# Patient Record
Sex: MALE | Race: WHITE | NOT HISPANIC OR LATINO | Employment: FULL TIME | URBAN - METROPOLITAN AREA
[De-identification: names, ages, dates, MRNs, and addresses within clinical notes are randomized per-mention and may not be internally consistent; named-entity substitution may affect disease eponyms.]

---

## 2019-09-26 ENCOUNTER — OFFICE VISIT (OUTPATIENT)
Dept: FAMILY MEDICINE | Facility: CLINIC | Age: 64
End: 2019-09-26
Payer: COMMERCIAL

## 2019-09-26 VITALS
TEMPERATURE: 98.7 F | OXYGEN SATURATION: 96 % | WEIGHT: 222 LBS | BODY MASS INDEX: 30.07 KG/M2 | HEART RATE: 89 BPM | DIASTOLIC BLOOD PRESSURE: 80 MMHG | SYSTOLIC BLOOD PRESSURE: 160 MMHG | HEIGHT: 72 IN

## 2019-09-26 DIAGNOSIS — M25.512 LEFT SHOULDER PAIN, UNSPECIFIED CHRONICITY: Primary | ICD-10-CM

## 2019-09-26 DIAGNOSIS — E11.69 DM TYPE 2 WITH DIABETIC MIXED HYPERLIPIDEMIA (CMS/HCC)  (CMS/HCC): ICD-10-CM

## 2019-09-26 DIAGNOSIS — Z23 NEED FOR SHINGLES VACCINE: ICD-10-CM

## 2019-09-26 DIAGNOSIS — Z12.11 COLON CANCER SCREENING: ICD-10-CM

## 2019-09-26 DIAGNOSIS — E78.2 DM TYPE 2 WITH DIABETIC MIXED HYPERLIPIDEMIA (CMS/HCC)  (CMS/HCC): ICD-10-CM

## 2019-09-26 DIAGNOSIS — Z23 NEED FOR TDAP VACCINATION: ICD-10-CM

## 2019-09-26 PROCEDURE — 90471 IMMUNIZATION ADMIN: CPT | Performed by: FAMILY MEDICINE

## 2019-09-26 PROCEDURE — 99214 OFFICE O/P EST MOD 30 MIN: CPT | Mod: 25 | Performed by: FAMILY MEDICINE

## 2019-09-26 PROCEDURE — 90750 HZV VACC RECOMBINANT IM: CPT | Performed by: FAMILY MEDICINE

## 2019-09-26 PROCEDURE — 90715 TDAP VACCINE 7 YRS/> IM: CPT | Performed by: FAMILY MEDICINE

## 2019-09-26 PROCEDURE — 90472 IMMUNIZATION ADMIN EACH ADD: CPT | Performed by: FAMILY MEDICINE

## 2019-09-26 RX ORDER — LISINOPRIL 20 MG/1
1 TABLET ORAL DAILY
COMMUNITY
Start: 2019-09-21 | End: 2024-04-02

## 2019-09-26 RX ORDER — ASPIRIN 81 MG/1
81 TABLET ORAL DAILY
COMMUNITY
Start: 2014-03-26

## 2019-09-26 RX ORDER — GLIMEPIRIDE 4 MG/1
4 TABLET ORAL 2 TIMES DAILY
COMMUNITY
Start: 2017-04-05

## 2019-09-26 RX ORDER — TESTOSTERONE 30 MG/1.5ML
1 SOLUTION TOPICAL DAILY
COMMUNITY
Start: 2017-04-05 | End: 2024-06-19

## 2019-09-26 RX ORDER — PANTOPRAZOLE SODIUM 40 MG/1
1 TABLET, DELAYED RELEASE ORAL DAILY
COMMUNITY
Start: 2019-09-23 | End: 2021-10-12 | Stop reason: SDUPTHER

## 2019-09-26 RX ORDER — ATORVASTATIN CALCIUM 40 MG/1
1 TABLET, FILM COATED ORAL DAILY
COMMUNITY
Start: 2019-09-23

## 2019-09-26 RX ORDER — EMPAGLIFLOZIN 25 MG/1
1 TABLET, FILM COATED ORAL DAILY
COMMUNITY
Start: 2019-09-22 | End: 2024-04-02 | Stop reason: SINTOL

## 2019-09-26 RX ORDER — METFORMIN HYDROCHLORIDE 500 MG/1
1000 TABLET, EXTENDED RELEASE ORAL 2 TIMES DAILY WITH MEALS
COMMUNITY
Start: 2019-09-23

## 2019-09-26 ASSESSMENT — ENCOUNTER SYMPTOMS
UNEXPECTED WEIGHT CHANGE: 0
EYES NEGATIVE: 1
FATIGUE: 0
LIMITED RANGE OF MOTION: 1
MUSCULOSKELETAL NEGATIVE: 1
PSYCHIATRIC NEGATIVE: 1
RESPIRATORY NEGATIVE: 1
NEUROLOGICAL NEGATIVE: 1
NUMBNESS: 0
CARDIOVASCULAR NEGATIVE: 1

## 2019-09-26 NOTE — PROGRESS NOTES
"  Subjective     Patient ID: Kvng Garland is a 64 y.o. male.    Shoulder Pain    The pain is present in the left shoulder. This is a chronic problem. Episode onset: 3 months  The problem occurs intermittently. The problem has been unchanged. The quality of the pain is described as aching. The pain is moderate. Associated symptoms include a limited range of motion. Pertinent negatives include no joint swelling or numbness. The symptoms are aggravated by activity. He has tried OTC pain meds for the symptoms. The treatment provided mild relief.       Review of Systems   Constitutional: Negative for fatigue and unexpected weight change.   HENT: Negative.    Eyes: Negative.    Respiratory: Negative.    Cardiovascular: Negative.    Genitourinary: Negative.    Musculoskeletal: Negative.    Skin: Negative.    Neurological: Negative.  Negative for numbness.   Psychiatric/Behavioral: Negative.        Objective     Vitals:    09/26/19 1056   BP: (!) 160/80   BP Location: Left upper arm   Patient Position: Sitting   Pulse: 89   Temp: 37.1 °C (98.7 °F)   TempSrc: Tympanic   SpO2: 96%   Weight: 101 kg (222 lb)   Height: 1.816 m (5' 11.5\")     Body mass index is 30.53 kg/m².    Physical Exam   Constitutional: He is oriented to person, place, and time. He appears well-developed and well-nourished.   HENT:   Head: Normocephalic.   Eyes: Pupils are equal, round, and reactive to light.   Cardiovascular: Normal rate, regular rhythm and normal heart sounds.    Pulmonary/Chest: Effort normal and breath sounds normal.   Abdominal: Bowel sounds are normal.   Musculoskeletal: He exhibits tenderness. He exhibits no edema or deformity.   Neurological: He is alert and oriented to person, place, and time.   Skin: Skin is warm and dry.   Psychiatric: He has a normal mood and affect.   Vitals reviewed.      Assessment/Plan   Diagnoses and all orders for this visit:    Left shoulder pain, unspecified chronicity (Primary)  -     Ambulatory " referral to Orthopedic Surgery; Future    DM type 2 with diabetic mixed hyperlipidemia (CMS/HCC) (HCC)    Colon cancer screening  -     FIT; Future    Follow up pending consult.     I, Mayr Dial, am scribing for, and in the presence of, Keith Soto DO.    9/26/2019 11:29 AM    IKeith DO, personally performed the services described in this documentation as scribed by Mary Dial in my presence, and it is both accurate and complete.

## 2019-12-06 ENCOUNTER — CLINICAL SUPPORT (OUTPATIENT)
Dept: FAMILY MEDICINE | Facility: CLINIC | Age: 64
End: 2019-12-06
Payer: COMMERCIAL

## 2019-12-06 DIAGNOSIS — Z23 NEED FOR SHINGLES VACCINE: Primary | ICD-10-CM

## 2019-12-06 PROCEDURE — 90471 IMMUNIZATION ADMIN: CPT | Performed by: INTERNAL MEDICINE

## 2019-12-06 PROCEDURE — 90750 HZV VACC RECOMBINANT IM: CPT | Performed by: INTERNAL MEDICINE

## 2020-08-20 ENCOUNTER — PATIENT OUTREACH (OUTPATIENT)
Dept: FAMILY MEDICINE | Facility: CLINIC | Age: 65
End: 2020-08-20

## 2021-02-06 ENCOUNTER — APPOINTMENT (EMERGENCY)
Dept: RADIOLOGY | Facility: HOSPITAL | Age: 66
End: 2021-02-06
Attending: EMERGENCY MEDICINE
Payer: COMMERCIAL

## 2021-02-06 ENCOUNTER — HOSPITAL ENCOUNTER (EMERGENCY)
Facility: HOSPITAL | Age: 66
Discharge: HOME | End: 2021-02-06
Attending: EMERGENCY MEDICINE
Payer: COMMERCIAL

## 2021-02-06 VITALS
DIASTOLIC BLOOD PRESSURE: 84 MMHG | RESPIRATION RATE: 16 BRPM | TEMPERATURE: 99.7 F | HEART RATE: 90 BPM | HEIGHT: 72 IN | OXYGEN SATURATION: 99 % | SYSTOLIC BLOOD PRESSURE: 164 MMHG | BODY MASS INDEX: 31.15 KG/M2 | WEIGHT: 230 LBS

## 2021-02-06 DIAGNOSIS — R42 VERTIGO: Primary | ICD-10-CM

## 2021-02-06 LAB
ALBUMIN SERPL-MCNC: 4.7 G/DL (ref 3.4–5)
ALP SERPL-CCNC: 65 IU/L (ref 35–126)
ALT SERPL-CCNC: 42 IU/L (ref 16–63)
ANION GAP SERPL CALC-SCNC: 10 MEQ/L (ref 3–15)
AST SERPL-CCNC: 24 IU/L (ref 15–41)
BASOPHILS # BLD: 0.06 K/UL (ref 0.01–0.1)
BASOPHILS NFR BLD: 0.7 %
BILIRUB SERPL-MCNC: 3.5 MG/DL (ref 0.3–1.2)
BUN SERPL-MCNC: 18 MG/DL (ref 8–20)
BURR CELLS BLD QL SMEAR: ABNORMAL
CALCIUM SERPL-MCNC: 9.1 MG/DL (ref 8.9–10.3)
CHLORIDE SERPL-SCNC: 105 MEQ/L (ref 98–109)
CO2 SERPL-SCNC: 20 MEQ/L (ref 22–32)
CREAT SERPL-MCNC: 1 MG/DL (ref 0.8–1.3)
DACRYOCYTES BLD QL SMEAR: ABNORMAL
DIFFERENTIAL METHOD BLD: ABNORMAL
EOSINOPHIL # BLD: 0 K/UL (ref 0.04–0.54)
EOSINOPHIL NFR BLD: 0 %
ERYTHROCYTE [DISTWIDTH] IN BLOOD BY AUTOMATED COUNT: 14.4 % (ref 11.6–14.4)
GFR SERPL CREATININE-BSD FRML MDRD: >60 ML/MIN/1.73M*2
GLUCOSE SERPL-MCNC: 155 MG/DL (ref 70–99)
HCT VFR BLDCO AUTO: 44.9 % (ref 40.1–51)
HGB BLD-MCNC: 15.5 G/DL (ref 13.7–17.5)
IMM GRANULOCYTES # BLD AUTO: 0.05 K/UL (ref 0–0.08)
IMM GRANULOCYTES NFR BLD AUTO: 0.6 %
LYMPHOCYTES # BLD: 0.44 K/UL (ref 1.2–3.5)
LYMPHOCYTES NFR BLD: 4.9 %
MCH RBC QN AUTO: 30.9 PG (ref 28–33.2)
MCHC RBC AUTO-ENTMCNC: 34.5 G/DL (ref 32.2–36.5)
MCV RBC AUTO: 89.6 FL (ref 83–98)
MONOCYTES # BLD: 0.46 K/UL (ref 0.3–1)
MONOCYTES NFR BLD: 5.1 %
NEUTROPHILS # BLD: 8.04 K/UL (ref 1.7–7)
NEUTS SEG NFR BLD: 88.7 %
NRBC BLD-RTO: 0 %
PDW BLD AUTO: 10.5 FL (ref 9.4–12.4)
PLAT MORPH BLD: NORMAL
PLATELET # BLD AUTO: 139 K/UL (ref 150–350)
PLATELET # BLD EST: ABNORMAL 10*3/UL
POIKILOCYTOSIS BLD QL SMEAR: ABNORMAL
POLYCHROMASIA BLD QL SMEAR: ABNORMAL
POTASSIUM SERPL-SCNC: 4 MEQ/L (ref 3.6–5.1)
PROT SERPL-MCNC: 7.6 G/DL (ref 6–8.2)
RBC # BLD AUTO: 5.01 M/UL (ref 4.5–5.8)
SODIUM SERPL-SCNC: 135 MEQ/L (ref 136–144)
SPHEROCYTES BLD QL SMEAR: ABNORMAL
TROPONIN I SERPL-MCNC: 0.02 NG/ML
WBC # BLD AUTO: 9.05 K/UL (ref 3.8–10.5)

## 2021-02-06 PROCEDURE — 70450 CT HEAD/BRAIN W/O DYE: CPT | Mod: MG

## 2021-02-06 PROCEDURE — 84484 ASSAY OF TROPONIN QUANT: CPT | Performed by: PHYSICIAN ASSISTANT

## 2021-02-06 PROCEDURE — 93005 ELECTROCARDIOGRAM TRACING: CPT

## 2021-02-06 PROCEDURE — 63700000 HC SELF-ADMINISTRABLE DRUG: Performed by: PHYSICIAN ASSISTANT

## 2021-02-06 PROCEDURE — 85025 COMPLETE CBC W/AUTO DIFF WBC: CPT | Performed by: PHYSICIAN ASSISTANT

## 2021-02-06 PROCEDURE — 36415 COLL VENOUS BLD VENIPUNCTURE: CPT | Performed by: PHYSICIAN ASSISTANT

## 2021-02-06 PROCEDURE — 99284 EMERGENCY DEPT VISIT MOD MDM: CPT | Mod: 25

## 2021-02-06 PROCEDURE — 80053 COMPREHEN METABOLIC PANEL: CPT | Performed by: PHYSICIAN ASSISTANT

## 2021-02-06 RX ORDER — MECLIZINE HYDROCHLORIDE 25 MG/1
25 TABLET ORAL 3 TIMES DAILY PRN
Qty: 30 TABLET | Refills: 0 | Status: SHIPPED | OUTPATIENT
Start: 2021-02-06 | End: 2021-10-12 | Stop reason: ALTCHOICE

## 2021-02-06 RX ORDER — MECLIZINE HYDROCHLORIDE 25 MG/1
25 TABLET ORAL ONCE
Status: COMPLETED | OUTPATIENT
Start: 2021-02-06 | End: 2021-02-06

## 2021-02-06 RX ADMIN — MECLIZINE HYDROCHLORIDE 25 MG: 25 TABLET ORAL at 16:33

## 2021-02-06 ASSESSMENT — ENCOUNTER SYMPTOMS
AGITATION: 0
EYE PAIN: 0
ARTHRALGIAS: 0
MYALGIAS: 1
CHILLS: 0
ABDOMINAL PAIN: 0
FATIGUE: 1
SEIZURES: 0
DYSURIA: 0
SORE THROAT: 0
SHORTNESS OF BREATH: 0
COUGH: 0
HEMATURIA: 0
CONFUSION: 0
FEVER: 0
DIZZINESS: 1
COLOR CHANGE: 0
PALPITATIONS: 0
VOMITING: 0
BACK PAIN: 0

## 2021-02-06 NOTE — ED ATTESTATION NOTE
I have personally seen and examined the patient.  I reviewed and agree with physician assistant / nurse practitioner’s assessment and plan of care, with the following exceptions: None  My examination, assessment, and plan of care of  Kvng Garland is as follows:       Vital Signs Review: Vital signs have been reviewed. The oxygen saturation is  SpO2: 96 % which is normal.    The patient is a 65-year-old male with past medical history of CVA, diabetes, hearing loss, who presents to the emergency department for evaluation of vertigo.  Patient received COVID-19 vaccination yesterday and today began having diffuse myalgias, nausea, and transient vertigo.  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  Patient's vertigo was worse with change in head position or standing.  Patient symptoms would resolve with laying still.  The patient states his symptoms have gradually improved.  He denies headache, neck pain, vision change, speech change, swallowing difficulty, new focal neurologic weakness, or numbness.  The patient has had reported CVA in the past with similar symptoms however patient's vertigo description was different at that time.    Physical exam: Vital signs reviewed.  General: Patient appears comfortable sitting up in chair.  HEENT: NCAT, EOMI, MMM, irregular pupils consistent with patient's known history of right penetrating trauma to the globe in the past.  Neck: Supple, nontender, full range of motion.  Chest: Lungs clear to auscultation bilaterally, no rales.  Heart: Regular rate and rhythm no murmurs.  Abdomen: Soft, nontender, nondistended, no guarding, rebound.  Extremities: No cyanosis, clubbing, edema, or calf tenderness.  Skin: Warm and dry, no rash.  Neuro: GCS 15.  5/5 strength bilateral upper and lower extremities, sensation normal, cranial nerves II to XII intact, finger-to-nose normal.    Plan: Check labs, CT scan brain rule  out acute CVA.    Labs Reviewed   CBC AND DIFF - Abnormal       Result Value    WBC 9.05      RBC 5.01      Hemoglobin 15.5      Hematocrit 44.9      MCV 89.6      MCH 30.9      MCHC 34.5      RDW 14.4      Platelets 139 (*)     MPV 10.5      Differential Type Auto      nRBC 0.0      Immature Granulocytes 0.6      Neutrophils 88.7      Lymphocytes 4.9      Monocytes 5.1      Eosinophils 0.0      Basophils 0.7      Immature Granulocytes, Absolute 0.05      Neutrophils, Absolute 8.04 (*)     Lymphocytes, Absolute 0.44 (*)     Monocytes, Absolute 0.46      Eosinophils, Absolute 0.00 (*)     Basophils, Absolute 0.06      PLT Morphology Normal      Platelet Estimate Decreased (51,000-149,000)      Poikilocytes 1+      Polychromasia Occasional      Spherocytes Occasional      Teardrop Cells Occasional      [place] Cells Occasional     COMPREHENSIVE METABOLIC PANEL - Abnormal    Sodium 135 (*)     Potassium 4.0      Chloride 105      CO2 20 (*)     BUN 18      Creatinine 1.0      Glucose 155 (*)     Calcium 9.1      AST (SGOT) 24      ALT (SGPT) 42      Alkaline Phosphatase 65      Total Protein 7.6      Albumin 4.7      Bilirubin, Total 3.5 (*)     eGFR >60.0      Anion Gap 10     TROPONIN I - Normal    Troponin I 0.02       CT HEAD WITHOUT IV CONTRAST   Final Result   IMPRESSION: 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  No acute intracranial hemorrhage or   acute cortical infarction demonstrated.      COMMENT: Computed tomography of the brain was performed at 3 mm intervals   without intravenous contrast administration.  The current examination was   compared to prior CT imaging dated 3/5/2014.      There is no evidence of mass effect or shift of the midline structures.  The   ventricular system is normal in size and configuration.  There is no evidence of   acute intracranial hemorrhage or acute cortical infarction.  There are no   extra-axial fluid collections.      There is evidence of an old left cerebellar infarction with  multiple small   lacunar and mild encephalomalacic changes of the inferior aspect of the left   lobe of the cerebellum.      The bony calvarium is intact.  Imaged portions of the paranasal sinuses and   mastoid air cells are clear and intact.      CT DOSE:  One or more dose reduction techniques (e.g. automated exposure   control, adjustment of the mA and/or kV according to patient size, use of   iterative reconstruction technique) utilized for this examination.         ECG 12 lead    (Results Pending)           Patient test results are reviewed.  The patient's symptoms have improved in the emergency department.  Patient states he feels better.  Patient is agreeable to follow-up closely with his PCP for reevaluation.  Patient is to return immediately for any new, concerning, or worsening symptoms.    Impression: Acute positional vertigo.  Possible COVID-19 vaccine side effects.         I was physically present for the key/critical portions of the following procedures: None     Miguel A Enriquez MD  02/06/21 8238

## 2021-02-06 NOTE — ED PROVIDER NOTES
HPI     Chief Complaint   Patient presents with   • Muscle Pain       The patient is a 65-year-old male with past medical history of type 2 diabetes, hearing loss, and prior stroke 5 years ago who presents today for evaluation of acute onset of dizziness prior to arrival.  Patient tells me he was working around noontime today when he developed sudden room spinning dizziness.  Patient states he had to lay down on a bed to recover.  Patient states when he remains still he felt much better.  He was able to call his wife to come pick him up from his job and bring him here.  He reports that he received his first dose of Moderna COVID-19 vaccine yesterday.  He has been having arm soreness and generalized myalgias today as well.  Patient reports his dizziness is similar to a prior CVA that he suffered 5 years ago.  He denies any associated focal neurological deficits today.  Patient denies focal weakness, headache, visual change, or speech changes. He reports that symptoms have overall improved but he still feels off balance.      History provided by:  Patient   used: No         Patient History     Past Medical History:   Diagnosis Date   • Hearing loss     deaf in right ear, left hearing loss   • Stroke (CMS/[facility]) 02/23/2014   • Type 2 diabetes mellitus (CMS/[facility])        Past Surgical History:   Procedure Laterality Date   • NO PAST SURGERIES         History reviewed. No pertinent family history.    Social History     Tobacco Use   • Smoking status: Never Smoker   • Smokeless tobacco: Never Used   Substance Use Topics   • Alcohol use: Yes     Comment: socially   • Drug use: No       Systems Reviewed from Nursing Triage:          Review of Systems     Review of Systems   Constitutional: Positive for fatigue. Negative for chills and fever.   HENT: Negative for ear pain and sore throat.    Eyes: Negative for pain and visual disturbance.   Respiratory: Negative for cough and shortness of breath.     Cardiovascular: Negative for chest pain and palpitations.   Gastrointestinal: Negative for abdominal pain and vomiting.   Endocrine: Negative for polyuria.   Genitourinary: Negative for dysuria and hematuria.   Musculoskeletal: Positive for myalgias. Negative for arthralgias and back pain.   Skin: Negative for color change and rash.   Neurological: Positive for dizziness. Negative for seizures and syncope.   Psychiatric/Behavioral: Negative for agitation and confusion.        Physical Exam     ED Vitals    Date/Time Temp Pulse Resp BP SpO2 [facility]   02/06/21 1717 -- 90 16 164/84 99 % MG   02/06/21 1634 37.6 °C (99.7 °F) 96 17 158/79 98 % HC   02/06/21 1458 37 °C (98.6 °F) 100 17 143/71 96 % SM   02/06/21 1317 37.4 °C (99.3 °F) 95 18 150/72 96 % DR          Pulse Ox %: 96 % (02/06/21 1430)  Pulse Ox Interpretation: Normal (02/06/21 1430)  Heart Rate: 95 (02/06/21 1430)  Rhythm Strip Interpretation: Normal Sinus Rhythm (02/06/21 1430)                                       Physical Exam  Vitals signs and nursing note reviewed.   Constitutional:       General: He is not in acute distress.     Appearance: Normal appearance. He is well-developed. He is not diaphoretic.   HENT:      Head: Normocephalic and atraumatic.   Eyes:      Extraocular Movements: Extraocular movements intact.      Right eye: Nystagmus (on right lateral gaze) present.      Left eye: Nystagmus present.      Conjunctiva/sclera: Conjunctivae normal.      Pupils: Pupils are equal, round, and reactive to light.   Neck:      Trachea: Trachea normal.   Cardiovascular:      Rate and Rhythm: Normal rate and regular rhythm.      Heart sounds: Normal heart sounds. No murmur.   Pulmonary:      Effort: Pulmonary effort is normal. No respiratory distress.      Breath sounds: Normal breath sounds. No wheezing, rhonchi or rales.   Chest:      Chest wall: No tenderness.   Abdominal:      General: Bowel sounds are normal. There is no distension.      Palpations: Abdomen  is soft. There is no mass.      Tenderness: There is no abdominal tenderness. There is no guarding or rebound.   Skin:     General: Skin is warm and dry.      Coloration: Skin is not pale.      Findings: No rash.   Neurological:      Mental Status: He is alert and oriented to person, place, and time.      Cranial Nerves: No cranial nerve deficit.      Sensory: No sensory deficit.      Motor: No weakness.      Coordination: Coordination normal.      Gait: Gait normal.   Psychiatric:         Speech: Speech normal.         Behavior: Behavior normal. Behavior is cooperative.         Thought Content: Thought content normal.         Judgment: Judgment normal.              Procedures    Results     Procedure Component Value Units Date/Time    CBC and differential [[id number]]  (Abnormal) Collected: 02/06/21 1456    Specimen: Blood, Venous Updated: 02/06/21 1544     WBC 9.05 K/uL      RBC 5.01 M/uL      Hemoglobin 15.5 g/dL      Hematocrit 44.9 %      MCV 89.6 fL      MCH 30.9 pg      MCHC 34.5 g/dL      RDW 14.4 %      Platelets 139 K/uL      Comment: ALL RESULTS HAVE BEEN RECHECKED. RESULTS CHECKED. CONFIRMED WITH SMEAR ESTIMATE. SPECIMEN QUALITY CHECKED. RESULTS OBTAINED AFTER VORTEXING TO ELIMINATE PLT CLUMPS        MPV 10.5 fL      Differential Type Auto     nRBC 0.0 %      Immature Granulocytes 0.6 %      Neutrophils 88.7 %      Lymphocytes 4.9 %      Monocytes 5.1 %      Eosinophils 0.0 %      Basophils 0.7 %      Immature Granulocytes, Absolute 0.05 K/uL      Neutrophils, Absolute 8.04 K/uL      Lymphocytes, Absolute 0.44 K/uL      Monocytes, Absolute 0.46 K/uL      Eosinophils, Absolute 0.00 K/uL      Basophils, Absolute 0.06 K/uL      PLT Morphology Normal     Platelet Estimate Decreased (51,000-149,000)     Poikilocytes 1+     Polychromasia Occasional     Spherocytes Occasional     Teardrop Cells Occasional     [place] Cells Occasional    Troponin I [[id number]]  (Normal) Collected: 02/06/21 1456    Specimen: Blood,  Venous Updated: 02/06/21 1531     Troponin I 0.02 ng/mL     Comprehensive metabolic panel [[id number]]  (Abnormal) Collected: 02/06/21 1456    Specimen: Blood, Venous Updated: 02/06/21 1526     Sodium 135 mEQ/L      Potassium 4.0 mEQ/L      Comment: Results obtained on plasma. Plasma Potassium values may be up to 0.4 mEQ/L less than serum values. The differences may be greater for patients with high platelet or white cell counts.        Chloride 105 mEQ/L      CO2 20 mEQ/L      BUN 18 mg/dL      Creatinine 1.0 mg/dL      Glucose 155 mg/dL      Calcium 9.1 mg/dL      AST (SGOT) 24 IU/L      ALT (SGPT) 42 IU/L      Alkaline Phosphatase 65 IU/L      Total Protein 7.6 g/dL      Comment: Test performed on plasma which typically contains approximately 0.4 g/dL more protein than serum.        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  No acute intracranial hemorrhage or  acute cortical infarction demonstrated.    COMMENT: Computed tomography of the brain was performed at 3 mm intervals  without intravenous contrast administration.  The current examination was  compared to prior CT imaging dated 3/5/2014.    There is no evidence of mass effect or shift of the midline structures.  The  ventricular system is normal in size and configuration.  There is no evidence of  acute intracranial hemorrhage or acute cortical infarction.  There are no  extra-axial fluid collections.    There is evidence of an old left cerebellar infarction with multiple small  lacunar and mild encephalomalacic changes of the inferior aspect of the left  lobe of the cerebellum.    The bony calvarium is intact.  Imaged portions of the paranasal sinuses and  mastoid air cells are clear and  intact.    CT DOSE:  One or more dose reduction techniques (e.g. automated exposure  control, adjustment of the mA and/or kV according to patient size, use of  iterative reconstruction technique) utilized for this examination.               Narrative:    CLINICAL HISTORY: Persistent recurring dizziness.                                ECG 12 lead   ED Interpretation   Rhythm: [NSR]  Rate: 95  P waves: [normal interval]  QRS: [normal QRS]  Axis: [LAD]  ST Segments: [no obvious ST elevation or ischemia]                          ED Course & MDM     MDM         ED Course as of Feb 06 1836   Sat Feb 06, 2021   1700 To patient's bedside. Patient exam unchanged. Patient resting comfortably.  Discussed results and plan with patient.  Patient verbalized understanding and agreeable without any questions or concerns.      Pt feeling better. Still some myalgias and fatigue but dizziness resolved. NO focal findings. CT does not show any acute findings. Discussed peripheral vertigo vs TIA. Pt prefers to go home. Will follow up with PCP.     Education provided on signs and symptoms that would warrant a return visit to the emergency department along with emphasis on importance of follow-up.  Patient verbalized understanding and agreeable.  All questions answered      [SL]      ED Course User Index  [SL] Liliana Castillo PA C         Clinical Impressions as of Feb 06 1836   Vertigo        Liliana Castillo PA C  02/06/21 1836

## 2021-02-06 NOTE — DISCHARGE INSTRUCTIONS
You may use the Meclizine as prescribed if needed for dizziness.     You should follow up with your PCP on Monday or Tuesday.     Continue to rest at home. Drink plenty of fluids. If your dizziness returns or worsens come back to any emergency department. If you develop any neurological symptoms like weakness on one side of your body,

## 2021-02-07 LAB
ATRIAL RATE: 95
P AXIS: 58
PR INTERVAL: 172
QRS DURATION: 90
QT INTERVAL: 338
QTC CALCULATION(BAZETT): 424
R AXIS: -33
T WAVE AXIS: 57
VENTRICULAR RATE: 95

## 2021-02-07 PROCEDURE — 93010 ELECTROCARDIOGRAM REPORT: CPT | Performed by: INTERNAL MEDICINE

## 2021-02-08 ENCOUNTER — TELEPHONE (OUTPATIENT)
Dept: FAMILY MEDICINE | Facility: CLINIC | Age: 66
End: 2021-02-08

## 2021-02-08 NOTE — TELEPHONE ENCOUNTER
[facility] - ED Follow Up From 02/06/2021    Patient was seen in [facility] ER on 2/6/21 for muscle pain and dizziness.  Patient states he had COVID Vaccinne on Friday 2/5/21,and due for 2nd one in a month and wants to know if he should get it. He feels alright today but not great. Please call [phone].

## 2021-02-09 NOTE — TELEPHONE ENCOUNTER
Left voicemail for patient.  It sounds like his symptoms are related to the Covid vaccine.  Advised that he should get the second 1.  We will try to call him tomorrow advised to take Tylenol for aches and fever.  And to stay hydrated

## 2021-02-11 NOTE — TELEPHONE ENCOUNTER
Spoke with patient.  He is much improved.  Really can attribute his symptoms to his Covid vaccine.  He is feeling much better.  And will be armed for the next 1 to be hydrated ahead of time.

## 2021-04-13 DIAGNOSIS — Z23 ENCOUNTER FOR IMMUNIZATION: ICD-10-CM

## 2021-10-12 ENCOUNTER — OFFICE VISIT (OUTPATIENT)
Dept: FAMILY MEDICINE | Facility: CLINIC | Age: 66
End: 2021-10-12
Payer: COMMERCIAL

## 2021-10-12 VITALS
HEART RATE: 86 BPM | BODY MASS INDEX: 30.2 KG/M2 | WEIGHT: 223 LBS | DIASTOLIC BLOOD PRESSURE: 70 MMHG | RESPIRATION RATE: 16 BRPM | TEMPERATURE: 97.1 F | SYSTOLIC BLOOD PRESSURE: 150 MMHG | HEIGHT: 72 IN | OXYGEN SATURATION: 98 %

## 2021-10-12 DIAGNOSIS — E11.9 TYPE 2 DIABETES MELLITUS WITHOUT COMPLICATION, WITHOUT LONG-TERM CURRENT USE OF INSULIN (CMS/HCC): ICD-10-CM

## 2021-10-12 DIAGNOSIS — E78.2 DM TYPE 2 WITH DIABETIC MIXED HYPERLIPIDEMIA (CMS/HCC)  (CMS/HCC): ICD-10-CM

## 2021-10-12 DIAGNOSIS — Z00.01 ANNUAL VISIT FOR GENERAL ADULT MEDICAL EXAMINATION WITH ABNORMAL FINDINGS: Primary | ICD-10-CM

## 2021-10-12 DIAGNOSIS — Z01.818 PREOP EXAMINATION: ICD-10-CM

## 2021-10-12 DIAGNOSIS — E11.69 DM TYPE 2 WITH DIABETIC MIXED HYPERLIPIDEMIA (CMS/HCC)  (CMS/HCC): ICD-10-CM

## 2021-10-12 PROCEDURE — 3008F BODY MASS INDEX DOCD: CPT | Performed by: INTERNAL MEDICINE

## 2021-10-12 PROCEDURE — 99396 PREV VISIT EST AGE 40-64: CPT | Performed by: INTERNAL MEDICINE

## 2021-10-12 RX ORDER — PANTOPRAZOLE SODIUM 40 MG/1
40 TABLET, DELAYED RELEASE ORAL DAILY
Qty: 90 TABLET | Refills: 1 | Status: SHIPPED | OUTPATIENT
Start: 2021-10-12 | End: 2022-03-30

## 2021-10-12 ASSESSMENT — PATIENT HEALTH QUESTIONNAIRE - PHQ9: SUM OF ALL RESPONSES TO PHQ9 QUESTIONS 1 & 2: 0

## 2021-10-13 PROBLEM — E78.2 DM TYPE 2 WITH DIABETIC MIXED HYPERLIPIDEMIA (CMS/HCC)  (CMS/HCC): Status: ACTIVE | Noted: 2021-10-13

## 2021-10-13 PROBLEM — E11.9 TYPE 2 DIABETES MELLITUS WITHOUT COMPLICATION, WITHOUT LONG-TERM CURRENT USE OF INSULIN (CMS/HCC): Status: ACTIVE | Noted: 2021-10-13

## 2021-10-13 PROBLEM — Z00.01 ANNUAL VISIT FOR GENERAL ADULT MEDICAL EXAMINATION WITH ABNORMAL FINDINGS: Status: ACTIVE | Noted: 2021-10-13

## 2021-10-13 PROBLEM — E11.69 DM TYPE 2 WITH DIABETIC MIXED HYPERLIPIDEMIA (CMS/HCC)  (CMS/HCC): Status: ACTIVE | Noted: 2021-10-13

## 2021-10-13 ASSESSMENT — ENCOUNTER SYMPTOMS
ENDOCRINE NEGATIVE: 1
HEMATOLOGIC/LYMPHATIC NEGATIVE: 1
NEUROLOGICAL NEGATIVE: 1
PSYCHIATRIC NEGATIVE: 1
MUSCULOSKELETAL NEGATIVE: 1
CONSTITUTIONAL NEGATIVE: 1
ALLERGIC/IMMUNOLOGIC NEGATIVE: 1
CARDIOVASCULAR NEGATIVE: 1
RESPIRATORY NEGATIVE: 1
GASTROINTESTINAL NEGATIVE: 1
EYES NEGATIVE: 1

## 2021-10-13 NOTE — ASSESSMENT & PLAN NOTE
Patient follows with endocrinology.  We will asked endocrine to send us his labs.  He reports he gets them done once every 3 months.  He needs a new referral for Dr. Mosqueda

## 2021-10-13 NOTE — ASSESSMENT & PLAN NOTE
Patient is a 66-year-old man with diabetes, hypertension, hypercholesterolemia and low testosterone who presents for his annual physical exam.  Overall the patient is doing well.  He does follow with diabetes and he follows with ophthalmology.  We will track down the reports from both of those  physicians.  Patient had a colonoscopy in 2013.  Findings did indicate a 10-year follow-up.  We will asked patient to sign release of records for ophthalmology and endocrine.  Colonoscopy is already documented but just needs to be put into care gap

## 2021-10-13 NOTE — PROGRESS NOTES
Subjective     Patient ID: Kvng Garland is a 66 y.o. male.    HPI patient here for annual physical  Review of Systems   Constitutional: Negative.    HENT: Negative.    Eyes: Negative.    Respiratory: Negative.    Cardiovascular: Negative.    Gastrointestinal: Negative.    Endocrine: Negative.    Genitourinary: Negative.    Musculoskeletal: Negative.    Skin: Negative.    Allergic/Immunologic: Negative.    Neurological: Negative.    Hematological: Negative.    Psychiatric/Behavioral: Negative.        Objective     Vitals:    10/12/21 1104   BP: (!) 150/70   BP Location: Left upper arm   Patient Position: Sitting   Pulse: 86   Resp: 16   Temp: 36.2 °C (97.1 °F)   TempSrc: Temporal   SpO2: 98%   Weight: 101 kg (223 lb)   Height: 1.829 m (6')     Body mass index is 30.24 kg/m².    Physical Exam  Vitals and nursing note reviewed.   Constitutional:       Appearance: He is well-developed.   HENT:      Head: Normocephalic and atraumatic.      Right Ear: External ear normal.      Left Ear: External ear normal.      Nose: Nose normal.   Eyes:      Conjunctiva/sclera: Conjunctivae normal.      Pupils: Pupils are equal, round, and reactive to light.   Cardiovascular:      Rate and Rhythm: Normal rate and regular rhythm.      Pulses: Normal pulses.      Heart sounds: Normal heart sounds.   Pulmonary:      Effort: Pulmonary effort is normal.      Breath sounds: Normal breath sounds.   Abdominal:      General: Bowel sounds are normal.      Palpations: Abdomen is soft.   Musculoskeletal:         General: Normal range of motion.      Cervical back: Normal range of motion and neck supple.   Skin:     General: Skin is warm and dry.      Capillary Refill: Capillary refill takes less than 2 seconds.   Neurological:      General: No focal deficit present.      Mental Status: He is alert and oriented to person, place, and time.   Psychiatric:         Mood and Affect: Mood normal.         Behavior: Behavior normal.         Thought  Content: Thought content normal.         Judgment: Judgment normal.         Assessment/Plan   Diagnoses and all orders for this visit:    Annual visit for general adult medical examination with abnormal findings (Primary)  Assessment & Plan:  Patient is a 66-year-old man with diabetes, hypertension, hypercholesterolemia and low testosterone who presents for his annual physical exam.  Overall the patient is doing well.  He does follow with diabetes and he follows with ophthalmology.  We will track down the reports from both of those  physicians.  Patient had a colonoscopy in 2013.  Findings did indicate a 10-year follow-up.  We will asked patient to sign release of records for ophthalmology and endocrine.  Colonoscopy is already documented but just needs to be put into care gap      Type 2 diabetes mellitus without complication, without long-term current use of insulin (CMS/[facility])  Assessment & Plan:  Patient follows with endocrinology.  We will asked endocrine to send us his labs.  He reports he gets them done once every 3 months.  He needs a new referral for Dr. Mosqueda    Orders:  -     Ambulatory referral to Endocrinology; Future    DM type 2 with diabetic mixed hyperlipidemia (CMS/[facility])  Assessment & Plan:  Patient is here for review of diabetes.  Patient does follow with endocrinology.  Does need a referral he is presently on Metformin 500 mg once at night.  Glimepiride 4 mg once daily and Jardiance 25 mg once daily.  Patient is on atorvastatin 40 mg daily for high cholesterol.      Preop examination  Assessment & Plan:  12/20/21: Subsequent to this visit patient requires preoperative clearance for cataract surgery.  Patient has a past medical history of diabetes, hypercholesterolemia.  Patient denies chest pain, shortness of breath, palpitations..  Preoperative clearance was delayed due to need for diabetic labs.  Hemoglobin A1c is 8.9.  I did confirm with ophthalmologist office that this should not pose a problem  with cataract surgery.  Patient achieves 4 METS in his activities of daily living.  He has no cardiopulmonary contraindications to surgery.  May proceed with the planned cataract surgery.      Other orders  -     pantoprazole 40 mg EC tablet; Take 1 tablet (40 mg total) by mouth daily.

## 2021-10-13 NOTE — ASSESSMENT & PLAN NOTE
Patient is here for review of diabetes.  Patient does follow with endocrinology.  Does need a referral he is presently on Metformin 500 mg once at night.  Glimepiride 4 mg once daily and Jardiance 25 mg once daily.  Patient is on atorvastatin 40 mg daily for high cholesterol.

## 2021-11-22 ENCOUNTER — TELEPHONE (OUTPATIENT)
Dept: FAMILY MEDICINE | Facility: CLINIC | Age: 66
End: 2021-11-22

## 2021-11-22 NOTE — TELEPHONE ENCOUNTER
Provider called about patient Kvng Garland requesting a letter of clearance for pt to have cataract surgery . Provider stated that patient needs physical to be seen . Patient was seen 10/12/2021 for a physical . The information is as following    Provider name : [facility]  Providers #: [phone] ex . 4   Provider Fax: [phone]  Contact name : Daniel

## 2021-11-24 DIAGNOSIS — E11.9 TYPE 2 DIABETES MELLITUS WITHOUT COMPLICATION, WITHOUT LONG-TERM CURRENT USE OF INSULIN (CMS/HCC): Primary | ICD-10-CM

## 2021-11-24 DIAGNOSIS — E78.2 DM TYPE 2 WITH DIABETIC MIXED HYPERLIPIDEMIA (CMS/HCC)  (CMS/HCC): ICD-10-CM

## 2021-11-24 DIAGNOSIS — E11.69 DM TYPE 2 WITH DIABETIC MIXED HYPERLIPIDEMIA (CMS/HCC)  (CMS/HCC): ICD-10-CM

## 2021-11-26 NOTE — TELEPHONE ENCOUNTER
Called patient. Even though I did see him 10/12. He has not had diabetic labs since 2020. Per  Dr. Mosqueda ( endocrinology)     Left patient voice mail about getting labs done before preoperative clearance can be given.   Labs are pending.. I believed slips were mailed. He was also told he come in and   lab slips at office .

## 2021-12-01 ENCOUNTER — TELEPHONE (OUTPATIENT)
Dept: FAMILY MEDICINE | Facility: CLINIC | Age: 66
End: 2021-12-01

## 2021-12-01 NOTE — TELEPHONE ENCOUNTER
Dr. England's office requesting office note with addendum stating pt is cleared for sx on 1/20. Per note in chart from Dr. Whelan pt to have labs completed before getting cleared. Spoke with pt to confirm he received scripts, he stated he will be going next Tuesday to have labs drawn

## 2021-12-09 ENCOUNTER — TELEPHONE (OUTPATIENT)
Dept: FAMILY MEDICINE | Facility: CLINIC | Age: 66
End: 2021-12-09
Payer: COMMERCIAL

## 2021-12-09 NOTE — TELEPHONE ENCOUNTER
[facility] Outreach: pt due for diabetic labs, ordered 11/24, also for pre op on 1/20 with Dr. England. Spoke with pt last week and he stated he will be going to the lab this past Tuesday, still not collected. Left VM for pt advising to get labs done asap.

## 2021-12-11 LAB
ALBUMIN SERPL-MCNC: 4.9 G/DL (ref 3.8–4.8)
ALBUMIN/CREAT UR: 17 MG/G CREAT (ref 0–29)
ALBUMIN/GLOB SERPL: 2.3 {RATIO} (ref 1.2–2.2)
ALP SERPL-CCNC: 70 IU/L (ref 44–121)
ALT SERPL-CCNC: 41 IU/L (ref 0–44)
AST SERPL-CCNC: 20 IU/L (ref 0–40)
BASOPHILS # BLD AUTO: 0.1 X10E3/UL (ref 0–0.2)
BASOPHILS NFR BLD AUTO: 1 %
BILIRUB SERPL-MCNC: 1.4 MG/DL (ref 0–1.2)
BUN SERPL-MCNC: 26 MG/DL (ref 8–27)
BUN/CREAT SERPL: 23 (ref 10–24)
CALCIUM SERPL-MCNC: 9.7 MG/DL (ref 8.6–10.2)
CHLORIDE SERPL-SCNC: 102 MMOL/L (ref 96–106)
CHOLEST SERPL-MCNC: 127 MG/DL (ref 100–199)
CO2 SERPL-SCNC: 18 MMOL/L (ref 20–29)
CREAT SERPL-MCNC: 1.12 MG/DL (ref 0.76–1.27)
CREAT UR-MCNC: 46.8 MG/DL
EOSINOPHIL # BLD AUTO: 0.1 X10E3/UL (ref 0–0.4)
EOSINOPHIL NFR BLD AUTO: 2 %
ERYTHROCYTE [DISTWIDTH] IN BLOOD BY AUTOMATED COUNT: 13.6 % (ref 11.6–15.4)
GLOBULIN SER CALC-MCNC: 2.1 G/DL (ref 1.5–4.5)
GLUCOSE SERPL-MCNC: 282 MG/DL (ref 65–99)
HBA1C MFR BLD: 8.9 % (ref 4.8–5.6)
HCT VFR BLD AUTO: 46.8 % (ref 37.5–51)
HDLC SERPL-MCNC: 26 MG/DL
HGB BLD-MCNC: 15.7 G/DL (ref 13–17.7)
IMM GRANULOCYTES # BLD AUTO: 0 X10E3/UL (ref 0–0.1)
IMM GRANULOCYTES NFR BLD AUTO: 0 %
LAB CORP EGFR IF AFRICN AM: 80 ML/MIN/1.73
LAB CORP EGFR IF NONAFRICN AM: 69 ML/MIN/1.73
LDLC SERPL CALC-MCNC: 46 MG/DL (ref 0–99)
LYMPHOCYTES # BLD AUTO: 1.6 X10E3/UL (ref 0.7–3.1)
LYMPHOCYTES NFR BLD AUTO: 22 %
MCH RBC QN AUTO: 30.8 PG (ref 26.6–33)
MCHC RBC AUTO-ENTMCNC: 33.5 G/DL (ref 31.5–35.7)
MCV RBC AUTO: 92 FL (ref 79–97)
MICROALBUMIN UR-MCNC: 8 UG/ML
MONOCYTES # BLD AUTO: 0.4 X10E3/UL (ref 0.1–0.9)
MONOCYTES NFR BLD AUTO: 5 %
NEUTROPHILS # BLD AUTO: 5.2 X10E3/UL (ref 1.4–7)
NEUTROPHILS NFR BLD AUTO: 70 %
PLATELET # BLD AUTO: 160 X10E3/UL (ref 150–450)
POTASSIUM SERPL-SCNC: 4.4 MMOL/L (ref 3.5–5.2)
PROT SERPL-MCNC: 7 G/DL (ref 6–8.5)
RBC # BLD AUTO: 5.09 X10E6/UL (ref 4.14–5.8)
SODIUM SERPL-SCNC: 137 MMOL/L (ref 134–144)
TRIGL SERPL-MCNC: 370 MG/DL (ref 0–149)
VLDLC SERPL CALC-MCNC: 55 MG/DL (ref 5–40)
WBC # BLD AUTO: 7.3 X10E3/UL (ref 3.4–10.8)

## 2021-12-20 PROBLEM — Z01.818 PREOP EXAMINATION: Status: ACTIVE | Noted: 2021-12-20

## 2021-12-20 NOTE — ASSESSMENT & PLAN NOTE
12/20/21: Subsequent to this visit patient requires preoperative clearance for cataract surgery.  Patient has a past medical history of diabetes, hypercholesterolemia.  Patient denies chest pain, shortness of breath, palpitations..  Preoperative clearance was delayed due to need for diabetic labs.  Hemoglobin A1c is 8.9.  I did confirm with ophthalmologist office that this should not pose a problem with cataract surgery.  Patient achieves 4 METS in his activities of daily living.  He has no cardiopulmonary contraindications to surgery.  May proceed with the planned cataract surgery.

## 2023-03-23 ENCOUNTER — OFFICE VISIT (OUTPATIENT)
Dept: FAMILY MEDICINE | Facility: CLINIC | Age: 68
End: 2023-03-23
Payer: COMMERCIAL

## 2023-03-23 VITALS
TEMPERATURE: 94.9 F | RESPIRATION RATE: 16 BRPM | HEART RATE: 94 BPM | DIASTOLIC BLOOD PRESSURE: 80 MMHG | OXYGEN SATURATION: 95 % | HEIGHT: 72 IN | SYSTOLIC BLOOD PRESSURE: 130 MMHG | WEIGHT: 215.3 LBS | BODY MASS INDEX: 29.16 KG/M2

## 2023-03-23 DIAGNOSIS — J45.21 MILD INTERMITTENT REACTIVE AIRWAY DISEASE WITH ACUTE EXACERBATION: ICD-10-CM

## 2023-03-23 DIAGNOSIS — E11.69 DM TYPE 2 WITH DIABETIC MIXED HYPERLIPIDEMIA (CMS/HCC)  (CMS/HCC): ICD-10-CM

## 2023-03-23 DIAGNOSIS — B96.89 ACUTE BACTERIAL SINUSITIS: Primary | ICD-10-CM

## 2023-03-23 DIAGNOSIS — J01.90 ACUTE BACTERIAL SINUSITIS: Primary | ICD-10-CM

## 2023-03-23 DIAGNOSIS — H91.91 HEARING LOSS OF RIGHT EAR, UNSPECIFIED HEARING LOSS TYPE: ICD-10-CM

## 2023-03-23 DIAGNOSIS — E78.2 DM TYPE 2 WITH DIABETIC MIXED HYPERLIPIDEMIA (CMS/HCC)  (CMS/HCC): ICD-10-CM

## 2023-03-23 PROBLEM — Z01.818 PREOP EXAMINATION: Status: RESOLVED | Noted: 2021-12-20 | Resolved: 2023-03-23

## 2023-03-23 PROBLEM — J45.909 REACTIVE AIRWAY DISEASE: Status: ACTIVE | Noted: 2023-03-23

## 2023-03-23 PROCEDURE — 3008F BODY MASS INDEX DOCD: CPT | Performed by: FAMILY MEDICINE

## 2023-03-23 PROCEDURE — 99213 OFFICE O/P EST LOW 20 MIN: CPT | Performed by: FAMILY MEDICINE

## 2023-03-23 RX ORDER — FLUTICASONE PROPIONATE 50 MCG
1 SPRAY, SUSPENSION (ML) NASAL DAILY
Qty: 16 G | Refills: 5 | Status: SHIPPED | OUTPATIENT
Start: 2023-03-23 | End: 2024-03-26

## 2023-03-23 RX ORDER — AMOXICILLIN AND CLAVULANATE POTASSIUM 875; 125 MG/1; MG/1
1 TABLET, FILM COATED ORAL 2 TIMES DAILY
Qty: 14 TABLET | Refills: 0 | Status: SHIPPED | OUTPATIENT
Start: 2023-03-23 | End: 2023-03-30

## 2023-03-23 RX ORDER — BENZONATATE 200 MG/1
200 CAPSULE ORAL 3 TIMES DAILY PRN
Qty: 30 CAPSULE | Refills: 0 | Status: SHIPPED | OUTPATIENT
Start: 2023-03-23 | End: 2023-04-02

## 2023-03-23 RX ORDER — ALBUTEROL SULFATE 90 UG/1
2 INHALANT RESPIRATORY (INHALATION) EVERY 6 HOURS PRN
Qty: 18 G | Refills: 1 | Status: SHIPPED | OUTPATIENT
Start: 2023-03-23 | End: 2024-03-19

## 2023-03-23 ASSESSMENT — ENCOUNTER SYMPTOMS
VOICE CHANGE: 0
SINUS PRESSURE: 1
FATIGUE: 1
CARDIOVASCULAR NEGATIVE: 1
COUGH: 1
RHINORRHEA: 1
TROUBLE SWALLOWING: 0
SINUS PAIN: 1

## 2023-03-23 NOTE — ASSESSMENT & PLAN NOTE
Use wheezing with his current respiratory tract infection and I put him on albuterol for use as needed.

## 2023-03-23 NOTE — ASSESSMENT & PLAN NOTE
Monitored by endocrinology with most recent hemoglobin A1c of 9 working on his diet and lifestyle changes.

## 2023-03-23 NOTE — PROGRESS NOTES
Subjective      Patient ID: Kvng Garland is a 67 y.o. male.  1955      This 67-year-old  male with a history of type 2 diabetes mellitus presents with URI symptoms that started 10 days ago.  He is having nasal congestion, severe sinus pressure, purulent rhinorrhea, cough, wheezing, ear pressure and poor energy.  He is using Mucinex and other over-the-counter medications with little relief.  He has had several negative COVID tests.  He was seen in urgent care on March 18 but was not prescribed any medication.    His past medical history significant for GERD, type 2 diabetes mellitus, hyperlipidemia, hypertension, hypogonadism.    Social history reveals a  man who has never smoked and works as a  for [employer] as well as a  on the side.      The following have been reviewed and updated as appropriate in this visit:   Tobacco  Allergies  Meds  Problems  Med Hx  Surg Hx  Fam Hx       Review of Systems   Constitutional: Positive for fatigue.   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 Negative for trouble swallowing and voice change.    Respiratory: Positive for cough.    Cardiovascular: Negative.        Objective     Vitals:    03/23/23 0917   BP: 130/80   BP Location: Left upper arm   Patient Position: Sitting   Pulse: 94   Resp: 16   Temp: (!) 34.9 °C (94.9 °F)   TempSrc: Oral   SpO2: 95%   Weight: 97.7 kg (215 lb 4.8 oz)   Height: 1.829 m (6')     Body mass index is 29.2 kg/m².    Physical Exam  Vitals reviewed.   Constitutional:       General: He is not in acute distress.     Appearance: He is normal weight. He is not ill-appearing.   HENT:      Head: Normocephalic and atraumatic.      Right Ear: Tympanic membrane, ear canal and external ear normal.      Left Ear: Tympanic membrane, ear canal and external ear  normal.      Nose: Congestion present. No rhinorrhea.      Mouth/Throat:      Pharynx: Posterior oropharyngeal erythema present. No oropharyngeal exudate.      Comments: He has copious postnasal drip.  Eyes:      Extraocular Movements: Extraocular movements intact.      Conjunctiva/sclera: Conjunctivae normal.   Cardiovascular:      Rate and Rhythm: Normal rate and regular rhythm.      Heart sounds: Normal heart sounds.   Pulmonary:      Effort: Pulmonary effort is normal.      Breath sounds: Normal breath sounds.   Musculoskeletal:      Right lower leg: No edema.      Left lower leg: No edema.   Lymphadenopathy:      Cervical: No cervical adenopathy.   Skin:     General: Skin is warm and dry.   Neurological:      Mental Status: He is alert.         Assessment/Plan   Diagnoses and all orders for this visit:    Acute bacterial sinusitis (Primary)  Assessment & Plan:  I put him on an antibiotic as well as a steroid nasal spray and gave him a cough suppressant.  If is not improved in 5 days he will let us know, sooner if he is worse.    Orders:  -     fluticasone propionate (FLONASE) 50 mcg/actuation nasal spray; Administer 1 spray into each nostril daily.  -     amoxicillin-pot clavulanate (AUGMENTIN) 875-125 mg per tablet; Take 1 tablet by mouth 2 (two) times a day for 7 days.    DM type 2 with diabetic mixed hyperlipidemia (CMS/[facility])  Assessment & Plan:  Monitored by endocrinology with most recent hemoglobin A1c of 9 working on his diet and lifestyle changes.      Mild intermittent reactive airway disease with acute exacerbation  Assessment & Plan:  Use wheezing with his current respiratory tract infection and I put him on albuterol for use as needed.    Orders:  -     benzonatate (TESSALON) 200 mg capsule; Take 1 capsule (200 mg total) by mouth 3 (three) times a day as needed for cough for up to 10 days.  -     albuterol HFA 90 mcg/actuation inhaler; Inhale 2 puffs every 6 (six) hours as needed for  wheezing.    Hearing loss of right ear, unspecified hearing loss type  Assessment & Plan:  He has no hearing on the right since birth and normally wears hearing aids.

## 2023-03-23 NOTE — ASSESSMENT & PLAN NOTE
I put him on an antibiotic as well as a steroid nasal spray and gave him a cough suppressant.  If is not improved in 5 days he will let us know, sooner if he is worse.

## 2023-06-12 ENCOUNTER — TELEPHONE (OUTPATIENT)
Dept: FAMILY MEDICINE | Facility: CLINIC | Age: 68
End: 2023-06-12
Payer: COMMERCIAL

## 2023-06-12 NOTE — TELEPHONE ENCOUNTER
Insurance Referral Request   Patient PCP: Mary Madison MD  Insurance Carrier: N/A  Specialist Name: Mosqueda  Provider Specialty:   Provider NPI: [id number]  Office Location:   Reason for Visit or Diagnosis Code: z00.00 / 45213  Appointment Date: 6/12/2023    Insurance Referral will be submitted to Insurance within 2 business days.     Confirmation - Referral [id number]  Effective: 06/12/2023     Expires: 09/10/2023

## 2023-10-25 ENCOUNTER — TELEPHONE (OUTPATIENT)
Dept: FAMILY MEDICINE | Facility: CLINIC | Age: 68
End: 2023-10-25
Payer: COMMERCIAL

## 2023-10-25 NOTE — TELEPHONE ENCOUNTER
Referred To  [facility]  Specialty:  Tier 2  Group NPI: [id number]  Provider ID: [id number]  Tax ID: [id number]        Confirmation - Referral [id number]  Effective: 10/25/2023     Expires: 01/23/2024

## 2024-02-26 ENCOUNTER — TELEPHONE (OUTPATIENT)
Dept: FAMILY MEDICINE | Facility: CLINIC | Age: 69
End: 2024-02-26
Payer: COMMERCIAL

## 2024-02-26 NOTE — TELEPHONE ENCOUNTER
Referral for Dr Codi Mosqueda     Referred To  [facility]  Specialty: Multi-Specialty Group  Tier 2  Group NPI: [id number]  Provider ID: [id number]  Tax ID: [id number]    Confirmation - Referral [id number]  Effective: 02/26/2024     Expires: 05/26/2024

## 2024-02-29 ENCOUNTER — TELEPHONE (OUTPATIENT)
Dept: FAMILY MEDICINE | Facility: CLINIC | Age: 69
End: 2024-02-29
Payer: COMMERCIAL

## 2024-03-08 ENCOUNTER — NURSE TRIAGE (OUTPATIENT)
Dept: FAMILY MEDICINE | Facility: CLINIC | Age: 69
End: 2024-03-08
Payer: COMMERCIAL

## 2024-03-08 DIAGNOSIS — E78.2 DM TYPE 2 WITH DIABETIC MIXED HYPERLIPIDEMIA (CMS/HCC)  (CMS/HCC): Primary | ICD-10-CM

## 2024-03-08 DIAGNOSIS — E11.69 DM TYPE 2 WITH DIABETIC MIXED HYPERLIPIDEMIA (CMS/HCC)  (CMS/HCC): Primary | ICD-10-CM

## 2024-03-08 PROBLEM — J01.90 ACUTE BACTERIAL SINUSITIS: Status: RESOLVED | Noted: 2023-03-23 | Resolved: 2024-03-08

## 2024-03-08 PROBLEM — B96.89 ACUTE BACTERIAL SINUSITIS: Status: RESOLVED | Noted: 2023-03-23 | Resolved: 2024-03-08

## 2024-03-08 RX ORDER — OSELTAMIVIR PHOSPHATE 75 MG/1
75 CAPSULE ORAL 2 TIMES DAILY
Qty: 10 CAPSULE | Refills: 0 | Status: SHIPPED | OUTPATIENT
Start: 2024-03-08 | End: 2024-03-26

## 2024-03-08 NOTE — TELEPHONE ENCOUNTER
"  Patient transferred to the Primary Care Telephonic Nurse Triage Line with complaints of patient's wife has been dx with Flu on Wednesday.     HPI:  Requesting Tamiflu   Fever, HA, nausea, slight cough   Typical Flu symptoms.     Patient does not have a portal to complete a telemed appt.     Just would like tamiflu since symptoms are the same as his wife       Disposition:  Discuss With PCP and Callback by Nurse Within 1 Hour    Care Advice:  Care Advice Given     Given By Given At Maren Mancia RN 3/8/2024 12:00 PM No    DISCUSS WITH PCP AND CALLBACK BY NURSE WITHIN 1 HOUR.       Patient/Caregiver understands and will follow care advice?  Yes, plans to follow advice        Reason for Disposition  • Influenza EXPOSURE within last 48 hours (2 days) and exposed person is HIGH RISK (e.g., age > 64 years, pregnant, HIV+, chronic medical condition)    Answer Assessment - Initial Assessment Questions  1. TYPE of EXPOSURE: \"How were you exposed?\" (e.g., close contact, not a close contact)      Close   2. DATE of EXPOSURE: \"When did the exposure occur?\" (e.g., hour, days, weeks)      Wednesday       5. SYMPTOMS: \"Do you have any symptoms?\" (e.g., cough, fever, sore throat, difficulty breathing).      Fever, headache, body aches, nausea, slight cough    Protocols used: INFLUENZA EXPOSURE-ADULT-[place]      "

## 2024-03-08 NOTE — TELEPHONE ENCOUNTER
Regarding: Headache  ----- Message from Laurie Delgado sent at 3/8/2024 11:52 AM EST -----  Red Flag: Patient has headache, fever of 101,body ache and nausea. Symptoms started last night, patient's wife had the flu.

## 2024-03-08 NOTE — TELEPHONE ENCOUNTER
Spoke w/ pt. States he has blood work done q3 months with Endo. Will request blood work to be sent over. Pt scheduled for annual on 3/26 at 8am.

## 2024-03-19 ENCOUNTER — NURSE TRIAGE (OUTPATIENT)
Dept: FAMILY MEDICINE | Facility: CLINIC | Age: 69
End: 2024-03-19

## 2024-03-19 ENCOUNTER — OFFICE VISIT (OUTPATIENT)
Dept: FAMILY MEDICINE | Facility: CLINIC | Age: 69
End: 2024-03-19
Payer: COMMERCIAL

## 2024-03-19 DIAGNOSIS — R11.2 NAUSEA AND VOMITING, UNSPECIFIED VOMITING TYPE: ICD-10-CM

## 2024-03-19 DIAGNOSIS — R53.83 OTHER FATIGUE: Primary | ICD-10-CM

## 2024-03-19 DIAGNOSIS — E11.69 DM TYPE 2 WITH DIABETIC MIXED HYPERLIPIDEMIA (CMS/HCC)  (CMS/HCC): ICD-10-CM

## 2024-03-19 DIAGNOSIS — K21.9 GASTROESOPHAGEAL REFLUX DISEASE WITHOUT ESOPHAGITIS: ICD-10-CM

## 2024-03-19 DIAGNOSIS — E78.2 DM TYPE 2 WITH DIABETIC MIXED HYPERLIPIDEMIA (CMS/HCC)  (CMS/HCC): ICD-10-CM

## 2024-03-19 DIAGNOSIS — Z86.711 HISTORY OF PULMONARY EMBOLUS (PE): ICD-10-CM

## 2024-03-19 PROBLEM — E11.9 TYPE 2 DIABETES MELLITUS WITHOUT COMPLICATION, WITHOUT LONG-TERM CURRENT USE OF INSULIN (CMS/HCC): Status: RESOLVED | Noted: 2021-10-13 | Resolved: 2024-03-19

## 2024-03-19 PROBLEM — Z00.01 ANNUAL VISIT FOR GENERAL ADULT MEDICAL EXAMINATION WITH ABNORMAL FINDINGS: Status: RESOLVED | Noted: 2021-10-13 | Resolved: 2024-03-19

## 2024-03-19 PROBLEM — J45.909 REACTIVE AIRWAY DISEASE: Status: RESOLVED | Noted: 2023-03-23 | Resolved: 2024-03-19

## 2024-03-19 PROBLEM — E11.9 DIABETES MELLITUS (CMS/HCC): Status: ACTIVE | Noted: 2023-03-18

## 2024-03-19 LAB
BILIRUBIN, POC: ABNORMAL
BLOOD URINE, POC: NEGATIVE
CLARITY, POC: ABNORMAL
COLOR, POC: ABNORMAL
EXPIRATION DATE: ABNORMAL
EXPIRATION DATE: ABNORMAL
GLUCOSE BLOOD, POC: 138
GLUCOSE URINE, POC: ABNORMAL
KETONES, POC: POSITIVE
LEUKOCYTE EST, POC: NEGATIVE
Lab: ABNORMAL
Lab: ABNORMAL
NITRITE, POC: NEGATIVE
PH, POC: 5
POCT MANUFACTURER: ABNORMAL
POCT MANUFACTURER: ABNORMAL
PROTEIN, POC: ABNORMAL
SPECIFIC GRAVITY, POC: 1.01
UROBILINOGEN, POC: >=8

## 2024-03-19 PROCEDURE — 99214 OFFICE O/P EST MOD 30 MIN: CPT | Performed by: FAMILY MEDICINE

## 2024-03-19 PROCEDURE — 81002 URINALYSIS NONAUTO W/O SCOPE: CPT | Performed by: FAMILY MEDICINE

## 2024-03-19 PROCEDURE — 82962 GLUCOSE BLOOD TEST: CPT | Performed by: FAMILY MEDICINE

## 2024-03-19 RX ORDER — SEMAGLUTIDE 0.68 MG/ML
INJECTION, SOLUTION SUBCUTANEOUS
COMMUNITY
Start: 2024-02-12

## 2024-03-19 RX ORDER — ONDANSETRON 4 MG/1
4 TABLET, ORALLY DISINTEGRATING ORAL EVERY 8 HOURS PRN
Qty: 12 TABLET | Refills: 0 | Status: SHIPPED | OUTPATIENT
Start: 2024-03-19 | End: 2024-03-26

## 2024-03-19 ASSESSMENT — PATIENT HEALTH QUESTIONNAIRE - PHQ9: SUM OF ALL RESPONSES TO PHQ9 QUESTIONS 1 & 2: 0

## 2024-03-19 NOTE — ASSESSMENT & PLAN NOTE
Cardiology Internal Medicine Pulmonology Severe fatigue which persists 11 days after being treated empirically for influenza which his wife was diagnosed with.  Exam is nonfocal and because of the fatigue is unclear.  Blood work and UA are ordered.   Quality 226: Preventive Care And Screening: Tobacco Use: Screening And Cessation Intervention: Patient screened for tobacco use and is an ex/non-smoker Detail Level: Detailed Quality 402: Tobacco Use And Help With Quitting Among Adolescents: Patient screened for tobacco and never smoked

## 2024-03-19 NOTE — ASSESSMENT & PLAN NOTE
Associated with flying from [place] to [place] then driving to [place] more than 10 years ago.  Was treated with anticoagulation for 6 months and now uses prophylaxis with flying.

## 2024-03-19 NOTE — PROGRESS NOTES
Subjective      Patient ID: Kvng Garland is a 68 y.o. male.  1955      This 68-year-old  male with diabetes, GERD and a distant history of PE after airplane travel presents complaining of feeling poorly for the past 11 days.  At that time he started with fatigue, body aches and intermittent cough a day after his wife was diagnosed with influenza.  Tamiflu was called to the pharmacy for him and although the cough has improved he continues to have severe fatigue, persistent body aches, poor appetite and last night also had nausea and vomiting which recurred this morning.  He has had no diarrhea.  He has been compliant with his medication although he is not sure that he took his Ozempic yesterday.  His dose was increased to 0.5 mg a couple weeks ago.  He saw his endocrinologist 3 weeks ago and was told that his blood work was okay.  He cannot tell me exactly what medication he takes.  He is not using albuterol or fluticasone nasal spray.      The following have been reviewed and updated as appropriate in this visit:   Tobacco  Allergies  Meds  Problems  Med Hx  Surg Hx  Fam Hx       Review of Systems    Objective     There were no vitals filed for this visit.  There is no height or weight on file to calculate BMI.    Physical Exam    Assessment/Plan   Diagnoses and all orders for this visit:    Other fatigue (Primary)  Assessment & Plan:  Severe fatigue which persists 11 days after being treated empirically for influenza which his wife was diagnosed with.  Exam is nonfocal and because of the fatigue is unclear.  Blood work and UA are ordered.    Orders:  -     POCT urinalysis dipstick  -     POCT glucose  -     COVID-19, FLU A+B AND RSV [facility]  -     CBC and differential; Future  -     Comprehensive metabolic panel; Future  -     Urinalysis with Reflex Culture; Future  -     CK, Total; Future  -     C-reactive protein; Future  -     Beta Hydroxybutyrate; Future  -     Urinalysis with  Reflex Culture    Nausea and vomiting, unspecified vomiting type  Assessment & Plan:  Nausea and vomiting x 2 in the past 24 hours in a patient who has had severe fatigue and bodyaches for the past 11 days since he was empirically treated with Tamiflu for body aches and coughing a day after his wife was diagnosed with influenza.  Cause unclear.  Blood work is ordered.  Zofran as needed, staying well-hydrated was discussed.  UA for ketones ordered today.    Orders:  -     ondansetron ODT (ZOFRAN-ODT) 4 mg disintegrating tablet; Take 1 tablet (4 mg total) by mouth every 8 (eight) hours as needed for nausea or vomiting for up to 7 days.  -     CBC and differential; Future  -     Comprehensive metabolic panel; Future  -     Urinalysis with Reflex Culture; Future    DM type 2 with diabetic mixed hyperlipidemia (CMS/HCC)   Assessment & Plan:  Managed by endocrinology.  Random blood sugar today is 138.  He has not eaten anything all day.  Staying well-hydrated was stressed.  He will hold off taking Ozempic until his symptoms resolved.      History of pulmonary embolus (PE)  Assessment & Plan:  Associated with flying from [place] to [place] then driving to [place] more than 10 years ago.  Was treated with anticoagulation for 6 months and now uses prophylaxis with flying.      Gastroesophageal reflux disease without esophagitis  Assessment & Plan:  Controlled on pantoprazole 40 mg daily.

## 2024-03-19 NOTE — TELEPHONE ENCOUNTER
"Patient transferred to the Primary Care Telephonic Nurse Triage Line with complaints of Vomiting/ Weakness    HPI:  Patient reports he was prescribed Tamiflu a few weeks ago (3/8/24)- his wife tested +for the Flu and he started having symptoms  -states he took the Tamiflu and finished it but he still is not feeling well  -states he is feeling generalized weakness and body aches  -still coughing at times  -Vomited once yesterday around lunchtime and then again today around 6am  -states since then he drank a bottle of coke and was able to keep that down  -not checking temperature- states he does not feel warm though  -Denies CP/ Abdominal Pain/ SOB  -He is urinating without issues  -Last BM was yesterday and that was normal  -states he has been taking Mucinex for the cough  -He has an appointment next week with PCP for Physical       Disposition:  See Today in Office- Scheduled with PCP for SDS today at 3pm for evaluation of current symptoms      Reason for Disposition  • Patient wants to be seen    Answer Assessment - Initial Assessment Questions  1. VOMITING SEVERITY: \"How many times have you vomited in the past 24 hours?\"      - MILD:  1 - 2 times/day     - MODERATE: 3 - 5 times/day, decreased oral intake without significant weight loss or symptoms of dehydration     - SEVERE: 6 or more times/day, vomits everything or nearly everything, with significant weight loss, symptoms of dehydration       2 times-   2. ONSET: \"When did the vomiting begin?\"       Yesterday in afternoon around lunch  3. FLUIDS: \"What fluids or food have you vomited up today?\" \"Have you been able to keep any fluids down?\"      Vomited once today at 6am- was able to drink a bottle of Coke today and keep it down  4. ABDOMEN PAIN: \"Are your having any abdomen pain?\" If Yes : \"How bad is it and what does it feel like?\" (e.g., crampy, dull, intermittent, constant)       Denies  5. DIARRHEA: \"Is there any diarrhea?\" If Yes, ask: \"How many times " "today?\"       Denies  6. CONTACTS: \"Is there anyone else in the family with the same symptoms?\"       No  7. CAUSE: \"What do you think is causing your vomiting?\"      Unknown  8. HYDRATION STATUS: \"Any signs of dehydration?\" (e.g., dry mouth [not only dry lips], too weak to stand) \"When did you last urinate?\"     Urinating- no issues- at work right now  9. OTHER SYMPTOMS: \"Do you have any other symptoms?\" (e.g., fever, headache, vertigo, vomiting blood or coffee grounds, recent head injury)      Cough on/off  10. PREGNANCY: \"Is there any chance you are pregnant?\" \"When was your last menstrual period?\"    Protocols used: VOMITING-ADULT-[place]    "

## 2024-03-19 NOTE — ASSESSMENT & PLAN NOTE
Managed by endocrinology.  Random blood sugar today is 138.  He has not eaten anything all day.  Staying well-hydrated was stressed.  He will hold off taking Ozempic until his symptoms resolved.

## 2024-03-19 NOTE — TELEPHONE ENCOUNTER
Regarding: Vomiting  ----- Message from Laurie Delgado sent at 3/19/2024 10:22 AM EDT -----  Red Flag: Patient vomiting, severely fatigued, body ache and congestion.

## 2024-03-19 NOTE — ASSESSMENT & PLAN NOTE
Nausea and vomiting x 2 in the past 24 hours in a patient who has had severe fatigue and bodyaches for the past 11 days since he was empirically treated with Tamiflu for body aches and coughing a day after his wife was diagnosed with influenza.  Cause unclear.  Blood work is ordered.  Zofran as needed, staying well-hydrated was discussed.  UA for ketones ordered today.

## 2024-03-21 LAB
ALBUMIN SERPL-MCNC: 4.5 G/DL (ref 3.9–4.9)
ALBUMIN/GLOB SERPL: 2.4 {RATIO} (ref 1.2–2.2)
ALP SERPL-CCNC: 56 IU/L (ref 44–121)
ALT SERPL-CCNC: 48 IU/L (ref 0–44)
APPEARANCE UR: CLEAR
AST SERPL-CCNC: 27 IU/L (ref 0–40)
BACTERIA #/AREA URNS HPF: NORMAL /[HPF]
BASOPHILS # BLD AUTO: 0 X10E3/UL (ref 0–0.2)
BASOPHILS NFR BLD AUTO: 0 %
BILIRUB SERPL-MCNC: 5 MG/DL (ref 0–1.2)
BILIRUB UR QL STRIP: NEGATIVE
BUN SERPL-MCNC: 26 MG/DL (ref 8–27)
BUN/CREAT SERPL: 19 (ref 10–24)
CALCIUM SERPL-MCNC: 8.7 MG/DL (ref 8.6–10.2)
CASTS URNS QL MICRO: NORMAL /LPF
CHLORIDE SERPL-SCNC: 99 MMOL/L (ref 96–106)
CK SERPL-CCNC: 85 U/L (ref 41–331)
CO2 SERPL-SCNC: 18 MMOL/L (ref 20–29)
COLOR UR: YELLOW
CREAT SERPL-MCNC: 1.38 MG/DL (ref 0.76–1.27)
CRP SERPL-MCNC: 116 MG/L (ref 0–10)
EGFRCR SERPLBLD CKD-EPI 2021: 56 ML/MIN/1.73
EOSINOPHIL # BLD AUTO: 0.1 X10E3/UL (ref 0–0.4)
EOSINOPHIL NFR BLD AUTO: 1 %
EPI CELLS #/AREA URNS HPF: NORMAL /HPF (ref 0–10)
ERYTHROCYTE [DISTWIDTH] IN BLOOD BY AUTOMATED COUNT: 14.6 % (ref 11.6–15.4)
GLOBULIN SER CALC-MCNC: 1.9 G/DL (ref 1.5–4.5)
GLUCOSE SERPL-MCNC: 156 MG/DL (ref 70–99)
GLUCOSE UR QL STRIP: ABNORMAL
HCT VFR BLD AUTO: 45.9 % (ref 37.5–51)
HGB BLD-MCNC: 15.5 G/DL (ref 13–17.7)
HGB UR QL STRIP: NEGATIVE
IMM GRANULOCYTES # BLD AUTO: 0 X10E3/UL (ref 0–0.1)
IMM GRANULOCYTES NFR BLD AUTO: 1 %
KETONES UR QL STRIP: ABNORMAL
LAB CORP URINALYSIS REFLEX: ABNORMAL
LEUKOCYTE ESTERASE UR QL STRIP: NEGATIVE
LYMPHOCYTES # BLD AUTO: 1.1 X10E3/UL (ref 0.7–3.1)
LYMPHOCYTES NFR BLD AUTO: 15 %
MCH RBC QN AUTO: 30.1 PG (ref 26.6–33)
MCHC RBC AUTO-ENTMCNC: 33.8 G/DL (ref 31.5–35.7)
MCV RBC AUTO: 89 FL (ref 79–97)
MICRO URNS: ABNORMAL
MONOCYTES # BLD AUTO: 0.5 X10E3/UL (ref 0.1–0.9)
MONOCYTES NFR BLD AUTO: 8 %
NEUTROPHILS # BLD AUTO: 5.2 X10E3/UL (ref 1.4–7)
NEUTROPHILS NFR BLD AUTO: 75 %
NITRITE UR QL STRIP: NEGATIVE
PH UR STRIP: 5.5 [PH] (ref 5–7.5)
PLATELET # BLD AUTO: 191 X10E3/UL (ref 150–450)
POTASSIUM SERPL-SCNC: 4.1 MMOL/L (ref 3.5–5.2)
PROT SERPL-MCNC: 6.4 G/DL (ref 6–8.5)
PROT UR QL STRIP: ABNORMAL
RBC # BLD AUTO: 5.15 X10E6/UL (ref 4.14–5.8)
RBC #/AREA URNS HPF: NORMAL /HPF (ref 0–2)
SODIUM SERPL-SCNC: 137 MMOL/L (ref 134–144)
SP GR UR STRIP: >=1.03 (ref 1–1.03)
UROBILINOGEN UR STRIP-MCNC: 0.2 MG/DL (ref 0.2–1)
WBC # BLD AUTO: 6.9 X10E3/UL (ref 3.4–10.8)
WBC #/AREA URNS HPF: NORMAL /HPF (ref 0–5)

## 2024-03-25 LAB — B-OH-BUTYR SERPL-MCNC: 18 MG/DL

## 2024-03-26 ENCOUNTER — OFFICE VISIT (OUTPATIENT)
Dept: FAMILY MEDICINE | Facility: CLINIC | Age: 69
End: 2024-03-26
Payer: COMMERCIAL

## 2024-03-26 VITALS
HEIGHT: 72 IN | RESPIRATION RATE: 16 BRPM | DIASTOLIC BLOOD PRESSURE: 60 MMHG | HEART RATE: 74 BPM | WEIGHT: 213 LBS | OXYGEN SATURATION: 98 % | TEMPERATURE: 97.5 F | BODY MASS INDEX: 28.85 KG/M2 | SYSTOLIC BLOOD PRESSURE: 120 MMHG

## 2024-03-26 DIAGNOSIS — Z11.59 SCREENING FOR VIRAL DISEASE: ICD-10-CM

## 2024-03-26 DIAGNOSIS — Z12.11 SCREENING FOR COLON CANCER: ICD-10-CM

## 2024-03-26 DIAGNOSIS — E78.2 DM TYPE 2 WITH DIABETIC MIXED HYPERLIPIDEMIA (CMS/HCC)  (CMS/HCC): ICD-10-CM

## 2024-03-26 DIAGNOSIS — Z00.00 HEALTHCARE MAINTENANCE: Primary | ICD-10-CM

## 2024-03-26 DIAGNOSIS — E87.20 METABOLIC ACIDOSIS: ICD-10-CM

## 2024-03-26 DIAGNOSIS — E11.69 DM TYPE 2 WITH DIABETIC MIXED HYPERLIPIDEMIA (CMS/HCC)  (CMS/HCC): ICD-10-CM

## 2024-03-26 DIAGNOSIS — H91.91 HEARING LOSS OF RIGHT EAR, UNSPECIFIED HEARING LOSS TYPE: ICD-10-CM

## 2024-03-26 DIAGNOSIS — R79.82 ELEVATED C-REACTIVE PROTEIN (CRP): ICD-10-CM

## 2024-03-26 DIAGNOSIS — R53.83 OTHER FATIGUE: ICD-10-CM

## 2024-03-26 DIAGNOSIS — K21.9 GASTROESOPHAGEAL REFLUX DISEASE WITHOUT ESOPHAGITIS: ICD-10-CM

## 2024-03-26 PROBLEM — R11.2 NAUSEA AND VOMITING: Status: RESOLVED | Noted: 2024-03-19 | Resolved: 2024-03-26

## 2024-03-26 PROBLEM — E11.9 DIABETES MELLITUS (CMS/HCC): Status: RESOLVED | Noted: 2023-03-18 | Resolved: 2024-03-26

## 2024-03-26 PROCEDURE — 99397 PER PM REEVAL EST PAT 65+ YR: CPT | Performed by: FAMILY MEDICINE

## 2024-03-26 PROCEDURE — 3008F BODY MASS INDEX DOCD: CPT | Performed by: FAMILY MEDICINE

## 2024-03-26 ASSESSMENT — ENCOUNTER SYMPTOMS
FATIGUE: 1
HEADACHES: 0
UNEXPECTED WEIGHT CHANGE: 0
CHILLS: 0
CARDIOVASCULAR NEGATIVE: 1
FEVER: 0
EYES NEGATIVE: 1
SEIZURES: 0
PSYCHIATRIC NEGATIVE: 1
ENDOCRINE NEGATIVE: 1
RESPIRATORY NEGATIVE: 1
DIZZINESS: 0
APPETITE CHANGE: 0
ACTIVITY CHANGE: 0
DIAPHORESIS: 0
LIGHT-HEADEDNESS: 1
GASTROINTESTINAL NEGATIVE: 1

## 2024-03-26 ASSESSMENT — PATIENT HEALTH QUESTIONNAIRE - PHQ9: SUM OF ALL RESPONSES TO PHQ9 QUESTIONS 1 & 2: 0

## 2024-03-26 NOTE — PATIENT INSTRUCTIONS
Reduce lisinopril to 10 mg (cut your 20 mg pill in half). This should reduce the dizziness you have when you stand up.  Stop the Jardiance.  Stop the cookies.  Get blood work done next week, not fasting.

## 2024-03-26 NOTE — ASSESSMENT & PLAN NOTE
Managed by endocrinology, Dr. Mosqueda with most recent hemoglobin A1c of 8.3% on February 22, 2024.  Poorly compliant with diet.  I suspect ketoacidosis related to Jardiance which I am going to have him hold.  He is convinced that he is severe fatigue was related to Ozempic with increasing dose from 0.25 mg to 0.5 mg weekly.  I will discuss this with his endocrinologist.  Diabetic eye exam was negative earlier this month.

## 2024-03-26 NOTE — ASSESSMENT & PLAN NOTE
Significantly elevated C-reactive protein of 116 on blood work last week in a patient who is not acutely ill.  Repeat testing along with autoimmune testing is ordered.  The patient has recurrent episodes of severe fatigue and describes difficulty raising his arms which raises a question of polymyalgia rheumatica.  After repeat blood work we will decide if he needs to see rheumatology or not.  He also had mild renal insufficiency and mildly elevated ALT which we will recheck.

## 2024-03-26 NOTE — ASSESSMENT & PLAN NOTE
He is referred for colonoscopy, it has been more than 10 years since his last one.  He needs Prevnar 13 which I will plan to give at an upcoming visit when he is feeling better.  He declines COVID and flu vaccines.  Hepatitis C testing is ordered.

## 2024-03-26 NOTE — PROGRESS NOTES
Subjective      Patient ID: Kvng Garland is a 68 y.o. male.  1955      This 68-year-old  male presents 6 days since I last saw him for follow-up.  He says 2 days ago is when he started to feel better with less fatigue.  He admits he could be doing better with his diet and admits to eating cookies daily and drinking a lot of diet soda.  He has discontinued Ozempic 3 weeks ago because he thinks his fatigue worsened when he increased his dose to 0.5 mg weekly.  He held his Jardiance this morning as I instructed him last night.  He says he has had episodes of severe fatigue on and off for years and sometimes is so tired he cannot lift his arms.  He denies chest pain, claudication, lower extremity edema or orthopnea.    His past medical, surgical and social histories were reviewed.        The following have been reviewed and updated as appropriate in this visit:   Allergies  Meds  Problems       Review of Systems   Constitutional:  Positive for fatigue. Negative for activity change, appetite change, chills, diaphoresis, fever and unexpected weight change.   HENT:  Positive for hearing loss (He is deaf in his right ear.  Decreased hearing on the left.).    Eyes: Negative.    Respiratory: Negative.     Cardiovascular: Negative.    Gastrointestinal: Negative.    Endocrine: Negative.    Genitourinary: Negative.    Neurological:  Positive for light-headedness (He often feels lightheaded when he stands up.). Negative for dizziness, seizures and headaches.   Psychiatric/Behavioral: Negative.         Objective     Vitals:    03/26/24 0753   BP: 120/60   Pulse: 74   Resp: 16   Temp: 36.4 °C (97.5 °F)   TempSrc: Temporal   SpO2: 98%   Weight: 96.6 kg (213 lb)   Height: 1.829 m (6')     Body mass index is 28.89 kg/m².    Physical Exam  Vitals reviewed.   Constitutional:       General: He is not in acute distress.     Appearance: He is normal weight. He is not ill-appearing.   HENT:      Head: Normocephalic  and atraumatic.      Right Ear: Tympanic membrane, ear canal and external ear normal.      Left Ear: Tympanic membrane, ear canal and external ear normal.      Nose: Congestion (I note congestion in his nose but he says this is his baseline.) present.   Neck:      Vascular: No carotid bruit.   Cardiovascular:      Rate and Rhythm: Normal rate and regular rhythm.      Pulses:           Dorsalis pedis pulses are 1+ on the right side and 1+ on the left side.        Posterior tibial pulses are 1+ on the right side and 1+ on the left side.      Heart sounds: Murmur (2 out of 6 systolic ejection murmur loudest at the left sternal border.) heard.   Pulmonary:      Effort: Pulmonary effort is normal.      Breath sounds: Normal breath sounds.   Musculoskeletal:      Cervical back: Neck supple.      Right lower leg: No edema.      Left lower leg: No edema.   Feet:      Right foot:      Protective Sensation: 5 sites tested.  5 sites sensed.      Skin integrity: Skin integrity normal.      Toenail Condition: Right toenails are normal.      Left foot:      Protective Sensation: 5 sites tested.  5 sites sensed.      Skin integrity: Skin integrity normal.      Toenail Condition: Left toenails are normal.   Lymphadenopathy:      Cervical: No cervical adenopathy.   Skin:     General: Skin is warm and dry.   Neurological:      General: No focal deficit present.      Mental Status: He is alert and oriented to person, place, and time.   Psychiatric:         Mood and Affect: Mood normal.       Lab Results   Component Value Date    WBC 6.9 03/20/2024    HGB 15.5 03/20/2024    HCT 45.9 03/20/2024     03/20/2024    CHOL 127 12/10/2021    TRIG 370 (H) 12/10/2021    HDL 26 (L) 12/10/2021    ALT 48 (H) 03/20/2024    AST 27 03/20/2024     03/20/2024    K 4.1 03/20/2024    CL 99 03/20/2024    CREATININE 1.38 (H) 03/20/2024    BUN 26 03/20/2024    CO2 18 (L) 03/20/2024    HGBA1C 8.9 (H) 12/10/2021    MICROALBUR 8.0 12/10/2021    Blood work done by endocrinology February 22, 2024 showed fasting blood sugar of 233, hemoglobin A1c of 8.3%, cholesterol 99, triglycerides 226, HDL 25 and LDL 38.    Assessment/Plan   Diagnoses and all orders for this visit:    Healthcare maintenance (Primary)  Assessment & Plan:  He is referred for colonoscopy, it has been more than 10 years since his last one.  He needs Prevnar 13 which I will plan to give at an upcoming visit when he is feeling better.  He declines COVID and flu vaccines.  Hepatitis C testing is ordered.      Screening for colon cancer  -     [facility] Colon[facility]    Metabolic acidosis  Assessment & Plan:  Elevated beta hydroxybutyrate on blood work 3/20/2024 consistent with ketoacidosis likely related to febrile illness along with Jardiance.  At this time I have asked him to put the Jardiance on hold and we will repeat his blood work next week.  Will have endocrinology decide if he should restart it or not.    Orders:  -     Beta Hydroxybutyrate; Future    Other fatigue  -     CBC and differential; Future  -     Comprehensive metabolic panel; Future  -     C-reactive protein; Future  -     Sedimentation rate, automated; Future  -     RICHELLE Screen (Automated); Future  -     Rheumatoid factor; Future  -     CCP IgG/IgA Antibodies; Future    Screening for viral disease  -     Hepatitis C antibody; Future    DM type 2 with diabetic mixed hyperlipidemia (CMS/HCC)   Assessment & Plan:  Managed by endocrinology, Dr. Mosqueda with most recent hemoglobin A1c of 8.3% on February 22, 2024.  Poorly compliant with diet.  I suspect ketoacidosis related to Jardiance which I am going to have him hold.  He is convinced that he is severe fatigue was related to Ozempic with increasing dose from 0.25 mg to 0.5 mg weekly.  I will discuss this with his endocrinologist.  Diabetic eye exam was negative earlier this month.      Gastroesophageal reflux disease without esophagitis  Assessment & Plan:  Controlled on  pantoprazole 40 mg daily.      Hearing loss of right ear, unspecified hearing loss type  Assessment & Plan:  He is supposed to wear hearing aid which she admits he uses infrequently.      Elevated C-reactive protein (CRP)  Assessment & Plan:  Significantly elevated C-reactive protein of 116 on blood work last week in a patient who is not acutely ill.  Repeat testing along with autoimmune testing is ordered.  The patient has recurrent episodes of severe fatigue and describes difficulty raising his arms which raises a question of polymyalgia rheumatica.  After repeat blood work we will decide if he needs to see rheumatology or not.  He also had mild renal insufficiency and mildly elevated ALT which we will recheck.

## 2024-03-26 NOTE — ASSESSMENT & PLAN NOTE
Elevated beta hydroxybutyrate on blood work 3/20/2024 consistent with ketoacidosis likely related to febrile illness along with Jardiance.  At this time I have asked him to put the Jardiance on hold and we will repeat his blood work next week.  Will have endocrinology decide if he should restart it or not.

## 2024-04-02 ENCOUNTER — TELEPHONE (OUTPATIENT)
Dept: FAMILY MEDICINE | Facility: CLINIC | Age: 69
End: 2024-04-02
Payer: COMMERCIAL

## 2024-04-02 RX ORDER — LOSARTAN POTASSIUM 100 MG/1
100 TABLET ORAL DAILY
Start: 2024-04-02 | End: 2024-06-19

## 2024-04-02 NOTE — TELEPHONE ENCOUNTER
I got a msg via fax from patient in regards to his blood pressure medication.  He reports that he is not on lisinopril but on losartan 100 mg daily.  Please tell him that I want him to decrease the losartan to 50 mg by only taking half a pill daily and monitor blood pressure if he has a cuff available.  Remind him to get his repeat blood work done later this week.

## 2024-04-05 LAB
ALBUMIN SERPL-MCNC: 4.6 G/DL (ref 3.9–4.9)
ALBUMIN/GLOB SERPL: 2.4 {RATIO} (ref 1.2–2.2)
ALP SERPL-CCNC: 60 IU/L (ref 44–121)
ALT SERPL-CCNC: 52 IU/L (ref 0–44)
AST SERPL-CCNC: 24 IU/L (ref 0–40)
BASOPHILS # BLD AUTO: 0.1 X10E3/UL (ref 0–0.2)
BASOPHILS NFR BLD AUTO: 1 %
BILIRUB SERPL-MCNC: 2.4 MG/DL (ref 0–1.2)
BUN SERPL-MCNC: 17 MG/DL (ref 8–27)
BUN/CREAT SERPL: 21 (ref 10–24)
CALCIUM SERPL-MCNC: 9.6 MG/DL (ref 8.6–10.2)
CHLORIDE SERPL-SCNC: 103 MMOL/L (ref 96–106)
CO2 SERPL-SCNC: 21 MMOL/L (ref 20–29)
CREAT SERPL-MCNC: 0.8 MG/DL (ref 0.76–1.27)
CRP SERPL-MCNC: 1 MG/L (ref 0–10)
EGFRCR SERPLBLD CKD-EPI 2021: 98 ML/MIN/1.73
EOSINOPHIL # BLD AUTO: 0.1 X10E3/UL (ref 0–0.4)
EOSINOPHIL NFR BLD AUTO: 1 %
ERYTHROCYTE [DISTWIDTH] IN BLOOD BY AUTOMATED COUNT: 14.7 % (ref 11.6–15.4)
ERYTHROCYTE [SEDIMENTATION RATE] IN BLOOD BY WESTERGREN METHOD: 7 MM/HR (ref 0–30)
GLOBULIN SER CALC-MCNC: 1.9 G/DL (ref 1.5–4.5)
GLUCOSE SERPL-MCNC: 287 MG/DL (ref 70–99)
HCT VFR BLD AUTO: 43.1 % (ref 37.5–51)
HCV IGG SERPL QL IA: NON REACTIVE
HGB BLD-MCNC: 14.6 G/DL (ref 13–17.7)
IMM GRANULOCYTES # BLD AUTO: 0 X10E3/UL (ref 0–0.1)
IMM GRANULOCYTES NFR BLD AUTO: 0 %
LYMPHOCYTES # BLD AUTO: 1.1 X10E3/UL (ref 0.7–3.1)
LYMPHOCYTES NFR BLD AUTO: 20 %
MCH RBC QN AUTO: 30.4 PG (ref 26.6–33)
MCHC RBC AUTO-ENTMCNC: 33.9 G/DL (ref 31.5–35.7)
MCV RBC AUTO: 90 FL (ref 79–97)
MONOCYTES # BLD AUTO: 0.4 X10E3/UL (ref 0.1–0.9)
MONOCYTES NFR BLD AUTO: 7 %
NEUTROPHILS # BLD AUTO: 3.9 X10E3/UL (ref 1.4–7)
NEUTROPHILS NFR BLD AUTO: 71 %
PLATELET # BLD AUTO: 142 X10E3/UL (ref 150–450)
POTASSIUM SERPL-SCNC: 4.4 MMOL/L (ref 3.5–5.2)
PROT SERPL-MCNC: 6.5 G/DL (ref 6–8.5)
RBC # BLD AUTO: 4.8 X10E6/UL (ref 4.14–5.8)
RHEUMATOID FACT SERPL-ACNC: <10 IU/ML
SODIUM SERPL-SCNC: 140 MMOL/L (ref 134–144)
WBC # BLD AUTO: 5.6 X10E3/UL (ref 3.4–10.8)

## 2024-04-06 LAB
ANA SER QL IF: NEGATIVE
CCP IGA+IGG SERPL IA-ACNC: 5 UNITS (ref 0–19)

## 2024-04-08 LAB — B-OH-BUTYR SERPL-MCNC: 2 MG/DL

## 2024-05-02 ENCOUNTER — TELEPHONE (OUTPATIENT)
Dept: FAMILY MEDICINE | Facility: CLINIC | Age: 69
End: 2024-05-02

## 2024-05-02 ENCOUNTER — OFFICE VISIT (OUTPATIENT)
Dept: FAMILY MEDICINE | Facility: CLINIC | Age: 69
End: 2024-05-02
Payer: COMMERCIAL

## 2024-05-02 VITALS
DIASTOLIC BLOOD PRESSURE: 70 MMHG | SYSTOLIC BLOOD PRESSURE: 140 MMHG | HEIGHT: 72 IN | RESPIRATION RATE: 16 BRPM | BODY MASS INDEX: 29.12 KG/M2 | TEMPERATURE: 97.7 F | WEIGHT: 215 LBS | OXYGEN SATURATION: 98 % | HEART RATE: 76 BPM

## 2024-05-02 DIAGNOSIS — E78.2 DM TYPE 2 WITH DIABETIC MIXED HYPERLIPIDEMIA (CMS/HCC)  (CMS/HCC): Primary | ICD-10-CM

## 2024-05-02 DIAGNOSIS — E87.20 METABOLIC ACIDOSIS: ICD-10-CM

## 2024-05-02 DIAGNOSIS — R53.83 OTHER FATIGUE: ICD-10-CM

## 2024-05-02 DIAGNOSIS — E11.69 DM TYPE 2 WITH DIABETIC MIXED HYPERLIPIDEMIA (CMS/HCC)  (CMS/HCC): Primary | ICD-10-CM

## 2024-05-02 LAB
EXPIRATION DATE: ABNORMAL
GLUCOSE BLOOD, POC: 401
Lab: ABNORMAL
POCT MANUFACTURER: ABNORMAL

## 2024-05-02 PROCEDURE — 99214 OFFICE O/P EST MOD 30 MIN: CPT | Performed by: FAMILY MEDICINE

## 2024-05-02 PROCEDURE — 82962 GLUCOSE BLOOD TEST: CPT | Performed by: FAMILY MEDICINE

## 2024-05-02 PROCEDURE — 3008F BODY MASS INDEX DOCD: CPT | Performed by: FAMILY MEDICINE

## 2024-05-02 ASSESSMENT — PATIENT HEALTH QUESTIONNAIRE - PHQ9: SUM OF ALL RESPONSES TO PHQ9 QUESTIONS 1 & 2: 0

## 2024-05-02 NOTE — PROGRESS NOTES
Subjective      Patient ID: Kvng Garland is a 68 y.o. male.  1955      This 68-year-old  male presents for follow-up 5 weeks since I last saw him.  Both the Jardiance he is feeling a lot better but not quite back to baseline.  He still has some fatigue and would like to feel better.  He has tried to decrease the amount of sugar in his diet but is not checking his blood sugars.  He states his blood pressures are in the 150/70 range.  He cannot tell me what medication he takes is he does not remember any of the names but does assure me that he is no longer taking Jardiance.  He denies chest pain or claudication and continues to work 2  jobs.        The following have been reviewed and updated as appropriate in this visit:   Tobacco  Allergies  Meds  Problems  Med Hx  Surg Hx  Fam Hx       Review of Systems    Objective     Vitals:    05/02/24 0838   BP: (!) 140/70   BP Location: Left upper arm   Patient Position: Sitting   Pulse: 76   Resp: 16   Temp: 36.5 °C (97.7 °F)   TempSrc: Oral   SpO2: 98%   Weight: 97.5 kg (215 lb)   Height: 1.829 m (6')     Body mass index is 29.16 kg/m².    Physical Exam  Vitals reviewed.   Constitutional:       General: He is not in acute distress.     Appearance: He is not ill-appearing.      Comments: His weight is stable.  He was not further examined.   Neurological:      Mental Status: He is alert.   Psychiatric:         Mood and Affect: Mood normal.       Lab Results   Component Value Date    WBC 5.6 04/04/2024    HGB 14.6 04/04/2024    HCT 43.1 04/04/2024     (L) 04/04/2024    CHOL 127 12/10/2021    TRIG 370 (H) 12/10/2021    HDL 26 (L) 12/10/2021    ALT 52 (H) 04/04/2024    AST 24 04/04/2024     04/04/2024    K 4.4 04/04/2024     04/04/2024    CREATININE 0.80 04/04/2024    BUN 17 04/04/2024    CO2 21 04/04/2024    HGBA1C 8.9 (H) 12/10/2021    MICROALBUR 8.0 12/10/2021        Assessment/Plan   Diagnoses and all orders for this  visit:    DM type 2 with diabetic mixed hyperlipidemia (CMS/HCC)  (CMS/[facility]) (Primary)  Assessment & Plan:  At first he tells me he has not had anything to eat this morning then admits he had coffee cake and his random fingerstick blood glucose in the office is 401.  I explained to him that I suspect his fatigue is related to his poorly controlled diabetes and stressed again the need to stop eating sugar.  I have asked him to start back on the Ozempic and continue metformin and glipizide but we may need to start him on insulin in the near future.  I will see him back in 2 weeks for recheck.  Referral back to his endocrinologist is also placed as he is due to see her in June.  I will have him repeat his blood work after his next visit with me.    Orders:  -     POCT glucose  -     Ambulatory referral to Endocrinology; Future    Metabolic acidosis  Assessment & Plan:  Resolved off Jardiance      Other fatigue  Assessment & Plan:  I suspect his fatigue is not related to poorly controlled diabetes as his metabolic acidosis has resolved and we will work on sugar control and do a further workup if his fatigue persists after sugars controlled.

## 2024-05-02 NOTE — TELEPHONE ENCOUNTER
Pt. Would be leaving to go to [place] from 5/15-5/22 He Would like to no how critical  it is to come for his 2 week f/up appt. You don't have anything open the days he was requesting to come back. Pt asking if he can come after his trip?

## 2024-05-02 NOTE — ASSESSMENT & PLAN NOTE
I suspect his fatigue is not related to poorly controlled diabetes as his metabolic acidosis has resolved and we will work on sugar control and do a further workup if his fatigue persists after sugars controlled.

## 2024-05-02 NOTE — ASSESSMENT & PLAN NOTE
At first he tells me he has not had anything to eat this morning then admits he had coffee cake and his random fingerstick blood glucose in the office is 401.  I explained to him that I suspect his fatigue is related to his poorly controlled diabetes and stressed again the need to stop eating sugar.  I have asked him to start back on the Ozempic and continue metformin and glipizide but we may need to start him on insulin in the near future.  I will see him back in 2 weeks for recheck.  Referral back to his endocrinologist is also placed as he is due to see her in June.  I will have him repeat his blood work after his next visit with me.

## 2024-05-03 ENCOUNTER — TELEPHONE (OUTPATIENT)
Dept: FAMILY MEDICINE | Facility: CLINIC | Age: 69
End: 2024-05-03

## 2024-06-19 ENCOUNTER — OFFICE VISIT (OUTPATIENT)
Dept: FAMILY MEDICINE | Facility: CLINIC | Age: 69
End: 2024-06-19
Payer: COMMERCIAL

## 2024-06-19 VITALS
HEIGHT: 72 IN | BODY MASS INDEX: 28.99 KG/M2 | TEMPERATURE: 98 F | DIASTOLIC BLOOD PRESSURE: 60 MMHG | SYSTOLIC BLOOD PRESSURE: 138 MMHG | HEART RATE: 83 BPM | WEIGHT: 214 LBS | OXYGEN SATURATION: 98 % | RESPIRATION RATE: 16 BRPM

## 2024-06-19 DIAGNOSIS — E29.1 HYPOGONADISM IN MALE: ICD-10-CM

## 2024-06-19 DIAGNOSIS — E11.69 DM TYPE 2 WITH DIABETIC MIXED HYPERLIPIDEMIA (CMS/HCC)  (CMS/HCC): ICD-10-CM

## 2024-06-19 DIAGNOSIS — I10 PRIMARY HYPERTENSION: Primary | ICD-10-CM

## 2024-06-19 DIAGNOSIS — E78.2 DM TYPE 2 WITH DIABETIC MIXED HYPERLIPIDEMIA (CMS/HCC)  (CMS/HCC): ICD-10-CM

## 2024-06-19 DIAGNOSIS — R53.83 OTHER FATIGUE: ICD-10-CM

## 2024-06-19 PROBLEM — R79.82 ELEVATED C-REACTIVE PROTEIN (CRP): Status: RESOLVED | Noted: 2024-03-26 | Resolved: 2024-06-19

## 2024-06-19 PROCEDURE — 3075F SYST BP GE 130 - 139MM HG: CPT | Performed by: FAMILY MEDICINE

## 2024-06-19 PROCEDURE — 99213 OFFICE O/P EST LOW 20 MIN: CPT | Performed by: FAMILY MEDICINE

## 2024-06-19 PROCEDURE — 3008F BODY MASS INDEX DOCD: CPT | Performed by: FAMILY MEDICINE

## 2024-06-19 PROCEDURE — 3078F DIAST BP <80 MM HG: CPT | Performed by: FAMILY MEDICINE

## 2024-06-19 RX ORDER — INSULIN GLARGINE 300 U/ML
20 INJECTION, SOLUTION SUBCUTANEOUS DAILY
COMMUNITY
Start: 2024-06-12

## 2024-06-19 RX ORDER — TESTOSTERONE UNDECANOATE 158 MG/1
1 CAPSULE, LIQUID FILLED ORAL 2 TIMES DAILY
COMMUNITY
Start: 2024-06-14

## 2024-06-19 RX ORDER — LOSARTAN POTASSIUM 100 MG/1
50 TABLET ORAL DAILY
Start: 2024-06-19 | End: 2024-12-16

## 2024-06-19 NOTE — ASSESSMENT & PLAN NOTE
Type 2 diabetes mellitus with hyperglycemia and hyperlipidemia for which he is endocrinologist Dr. Mosqueda started him on Toujeo 20 units nightly a week ago.  He his blood sugars are improving and I again stressed the need to be compliant with his diet.  I will follow along conservatively at this point and see him back in 6 months for recheck.

## 2024-06-19 NOTE — ASSESSMENT & PLAN NOTE
Fatigue is resolved now except for when he is in the heat and again stressed hydration and good sugar control.

## 2024-06-19 NOTE — ASSESSMENT & PLAN NOTE
Did not tolerate topical testosterone and is now on oral supplementation managed by endocrinology.

## 2024-06-19 NOTE — PROGRESS NOTES
Subjective      Patient ID: Kvng Garland is a 69 y.o. male.  1955      This 69-year-old  male presents 6 weeks since I last saw him for follow-up.  A couple weeks ago he saw his endocrinologist, Dr. Mosqueda who started him on insulin and since then his blood sugars have decreased by about 100 points.  He is now getting fasting sugars in the high 100s to low 200s.  He is other medication is unchanged except for his testosterone which is now on an oral form as he did not like the consistency or the feel of the topical testosterone.  He is looking forward to a family vacation to the [facility] next month and told me about his 4 grandchildren all under the age of 3-1/2 which will be there along with his children.  He is working outdoors in the heat and is trying to stay well-hydrated.  He thinks the heat is causing him to have some fatigue but generally he feels much better than he did 3 months ago.        The following have been reviewed and updated as appropriate in this visit:   Tobacco  Allergies  Meds  Problems  Med Hx  Surg Hx  Fam Hx       Review of Systems    Objective     Vitals:    06/19/24 0922   BP: 138/60   Pulse: 83   Resp: 16   Temp: 36.7 °C (98 °F)   SpO2: 98%   Weight: 97.1 kg (214 lb)   Height: 1.829 m (6')     Body mass index is 29.02 kg/m².    Physical Exam  Vitals reviewed.   Constitutional:       General: He is not in acute distress.     Appearance: He is normal weight. He is not ill-appearing.      Comments: He is weight is stable and he was not further examined.   Neurological:      Mental Status: He is alert.   Psychiatric:         Mood and Affect: Mood normal.         Assessment/Plan   Diagnoses and all orders for this visit:    Primary hypertension (Primary)  -     losartan (COZAAR) 100 mg tablet; Take 0.5 tablets (50 mg total) by mouth daily.    DM type 2 with diabetic mixed hyperlipidemia (CMS/HCC)  (CMS/HCC)  Assessment & Plan:  Type 2 diabetes mellitus with  hyperglycemia and hyperlipidemia for which he is endocrinologist Dr. Mosqueda started him on Toujeo 20 units nightly a week ago.  He his blood sugars are improving and I again stressed the need to be compliant with his diet.  I will follow along conservatively at this point and see him back in 6 months for recheck.      Other fatigue  Assessment & Plan:  Fatigue is resolved now except for when he is in the heat and again stressed hydration and good sugar control.      Hypogonadism in male  Assessment & Plan:  Did not tolerate topical testosterone and is now on oral supplementation managed by endocrinology.

## 2024-08-13 ENCOUNTER — TELEPHONE (OUTPATIENT)
Dept: FAMILY MEDICINE | Facility: CLINIC | Age: 69
End: 2024-08-13
Payer: COMMERCIAL

## 2024-08-13 NOTE — TELEPHONE ENCOUNTER
Gastroenterology referral     Provider  Confirmation - Referral [id number]  Effective: 08/13/2024     Expires: 11/11/2024     Facility     Confirmation - Referral [id number]  Effective: 08/13/2024     Expires: 11/11/2024

## 2024-09-08 ENCOUNTER — APPOINTMENT (EMERGENCY)
Dept: RADIOLOGY | Facility: HOSPITAL | Age: 69
End: 2024-09-08
Attending: EMERGENCY MEDICINE
Payer: COMMERCIAL

## 2024-09-08 ENCOUNTER — APPOINTMENT (EMERGENCY)
Dept: RADIOLOGY | Facility: HOSPITAL | Age: 69
End: 2024-09-08
Payer: COMMERCIAL

## 2024-09-08 ENCOUNTER — HOSPITAL ENCOUNTER (EMERGENCY)
Facility: HOSPITAL | Age: 69
Discharge: HOME | End: 2024-09-08
Attending: EMERGENCY MEDICINE | Admitting: EMERGENCY MEDICINE
Payer: COMMERCIAL

## 2024-09-08 VITALS
RESPIRATION RATE: 21 BRPM | SYSTOLIC BLOOD PRESSURE: 173 MMHG | HEART RATE: 78 BPM | OXYGEN SATURATION: 98 % | DIASTOLIC BLOOD PRESSURE: 80 MMHG | TEMPERATURE: 100 F

## 2024-09-08 DIAGNOSIS — U07.1 COVID-19: Primary | ICD-10-CM

## 2024-09-08 DIAGNOSIS — R42 LIGHTHEADEDNESS: ICD-10-CM

## 2024-09-08 LAB
ALBUMIN SERPL-MCNC: 4.7 G/DL (ref 3.5–5.7)
ALP SERPL-CCNC: 40 IU/L (ref 34–125)
ALT SERPL-CCNC: 26 IU/L (ref 7–52)
ANION GAP SERPL CALC-SCNC: 9 MEQ/L (ref 3–15)
AST SERPL-CCNC: 16 IU/L (ref 13–39)
ATRIAL RATE: 77
BASOPHILS # BLD: 0.04 K/UL (ref 0.01–0.1)
BASOPHILS NFR BLD: 0.5 %
BILIRUB SERPL-MCNC: 3.4 MG/DL (ref 0.3–1.2)
BUN SERPL-MCNC: 17 MG/DL (ref 7–25)
CALCIUM SERPL-MCNC: 9.5 MG/DL (ref 8.6–10.3)
CHLORIDE SERPL-SCNC: 103 MEQ/L (ref 98–107)
CO2 SERPL-SCNC: 25 MEQ/L (ref 21–31)
CREAT SERPL-MCNC: 1.2 MG/DL (ref 0.7–1.3)
DIFFERENTIAL METHOD BLD: ABNORMAL
EGFRCR SERPLBLD CKD-EPI 2021: >60 ML/MIN/1.73M*2
EOSINOPHIL # BLD: 0.01 K/UL (ref 0.04–0.54)
EOSINOPHIL NFR BLD: 0.1 %
ERYTHROCYTE [DISTWIDTH] IN BLOOD BY AUTOMATED COUNT: 15 % (ref 11.6–14.4)
FLUAV RNA SPEC QL NAA+PROBE: NEGATIVE
FLUBV RNA SPEC QL NAA+PROBE: NEGATIVE
GLUCOSE BLD-MCNC: 279 MG/DL (ref 70–99)
GLUCOSE SERPL-MCNC: 250 MG/DL (ref 70–99)
HCT VFR BLD AUTO: 41.4 % (ref 40.1–51)
HGB BLD-MCNC: 13.8 G/DL (ref 13.7–17.5)
IMM GRANULOCYTES # BLD AUTO: 0.03 K/UL (ref 0–0.08)
IMM GRANULOCYTES NFR BLD AUTO: 0.4 %
LYMPHOCYTES # BLD: 0.51 K/UL (ref 1.2–3.5)
LYMPHOCYTES NFR BLD: 6.5 %
MCH RBC QN AUTO: 29.9 PG (ref 28–33.2)
MCHC RBC AUTO-ENTMCNC: 33.3 G/DL (ref 32.2–36.5)
MCV RBC AUTO: 89.6 FL (ref 83–98)
MONOCYTES # BLD: 0.65 K/UL (ref 0.3–1)
MONOCYTES NFR BLD: 8.3 %
NEUTROPHILS # BLD: 6.59 K/UL (ref 1.7–7)
NEUTS SEG NFR BLD: 84.2 %
NRBC BLD-RTO: 0 %
P AXIS: 31
PDW BLD AUTO: 10.6 FL (ref 9.4–12.4)
PLATELET # BLD AUTO: 140 K/UL (ref 150–350)
POCT TEST: ABNORMAL
POTASSIUM SERPL-SCNC: 4.1 MEQ/L (ref 3.5–5.1)
PR INTERVAL: 172
PROT SERPL-MCNC: 7.7 G/DL (ref 6–8.2)
QRS DURATION: 98
QT INTERVAL: 392
QTC CALCULATION(BAZETT): 443
R AXIS: -38
RBC # BLD AUTO: 4.62 M/UL (ref 4.5–5.8)
RSV RNA SPEC QL NAA+PROBE: NEGATIVE
SARS-COV-2 RNA RESP QL NAA+PROBE: POSITIVE
SODIUM SERPL-SCNC: 137 MEQ/L (ref 136–145)
T WAVE AXIS: 57
TROPONIN I SERPL HS-MCNC: 17.5 PG/ML
TROPONIN I SERPL HS-MCNC: 17.7 PG/ML
VENTRICULAR RATE: 77
WBC # BLD AUTO: 7.83 K/UL (ref 3.8–10.5)

## 2024-09-08 PROCEDURE — 71045 X-RAY EXAM CHEST 1 VIEW: CPT

## 2024-09-08 PROCEDURE — 85025 COMPLETE CBC W/AUTO DIFF WBC: CPT | Performed by: EMERGENCY MEDICINE

## 2024-09-08 PROCEDURE — 84484 ASSAY OF TROPONIN QUANT: CPT | Mod: 91 | Performed by: EMERGENCY MEDICINE

## 2024-09-08 PROCEDURE — 25800000 HC PHARMACY IV SOLUTIONS

## 2024-09-08 PROCEDURE — 84484 ASSAY OF TROPONIN QUANT: CPT | Performed by: EMERGENCY MEDICINE

## 2024-09-08 PROCEDURE — 70450 CT HEAD/BRAIN W/O DYE: CPT

## 2024-09-08 PROCEDURE — 36415 COLL VENOUS BLD VENIPUNCTURE: CPT | Performed by: EMERGENCY MEDICINE

## 2024-09-08 PROCEDURE — 63600000 HC DRUGS/DETAIL CODE: Mod: JZ

## 2024-09-08 PROCEDURE — 93005 ELECTROCARDIOGRAM TRACING: CPT

## 2024-09-08 PROCEDURE — 99284 EMERGENCY DEPT VISIT MOD MDM: CPT | Mod: 25

## 2024-09-08 PROCEDURE — 3E033GC INTRODUCTION OF OTHER THERAPEUTIC SUBSTANCE INTO PERIPHERAL VEIN, PERCUTANEOUS APPROACH: ICD-10-PCS | Performed by: EMERGENCY MEDICINE

## 2024-09-08 PROCEDURE — 87637 SARSCOV2&INF A&B&RSV AMP PRB: CPT | Performed by: EMERGENCY MEDICINE

## 2024-09-08 PROCEDURE — 93005 ELECTROCARDIOGRAM TRACING: CPT | Performed by: EMERGENCY MEDICINE

## 2024-09-08 PROCEDURE — 80053 COMPREHEN METABOLIC PANEL: CPT | Performed by: EMERGENCY MEDICINE

## 2024-09-08 PROCEDURE — 96361 HYDRATE IV INFUSION ADD-ON: CPT

## 2024-09-08 PROCEDURE — 96374 THER/PROPH/DIAG INJ IV PUSH: CPT

## 2024-09-08 PROCEDURE — 3E0337Z INTRODUCTION OF ELECTROLYTIC AND WATER BALANCE SUBSTANCE INTO PERIPHERAL VEIN, PERCUTANEOUS APPROACH: ICD-10-PCS | Performed by: EMERGENCY MEDICINE

## 2024-09-08 PROCEDURE — 70551 MRI BRAIN STEM W/O DYE: CPT

## 2024-09-08 RX ORDER — ONDANSETRON HYDROCHLORIDE 2 MG/ML
4 INJECTION, SOLUTION INTRAVENOUS ONCE
Status: COMPLETED | OUTPATIENT
Start: 2024-09-08 | End: 2024-09-08

## 2024-09-08 RX ADMIN — ONDANSETRON HYDROCHLORIDE 4 MG: 2 SOLUTION INTRAMUSCULAR; INTRAVENOUS at 19:09

## 2024-09-08 RX ADMIN — SODIUM CHLORIDE 1000 ML: 9 INJECTION, SOLUTION INTRAVENOUS at 10:32

## 2024-09-08 ASSESSMENT — ENCOUNTER SYMPTOMS
SORE THROAT: 0
PALPITATIONS: 0
CONSTIPATION: 0
COUGH: 0
COLOR CHANGE: 0
HEMATURIA: 0
ABDOMINAL PAIN: 0
LIGHT-HEADEDNESS: 1
HEADACHES: 0
NAUSEA: 0
DIZZINESS: 0
FEVER: 0
FATIGUE: 1
SHORTNESS OF BREATH: 0
CHILLS: 0
VOMITING: 0
DIARRHEA: 0
SPEECH DIFFICULTY: 0
DYSURIA: 0

## 2024-09-08 NOTE — DISCHARGE INSTRUCTIONS
COVID-19 Discharge Instructions    You have been diagnosed with COVID-19.  Drink plenty of fluids.  Tylenol or motrin as needed for fever as instructed on the bottle.  Use a pulse oximeter at home to measure your oxygen levels.  If your oxygen level goes below 92% or you feel short of breath at rest or with activity, you should return to the Emergency Department.  Return to the ED with any worsening of symptoms such as lethargy, confusion, chest pain, trouble breathing, or any problems or concerns.      You are recommended to follow-up with your primary care provider for reevaluation and continued care.  Is important that you call make an appointment to follow up with your healthcare provider for re-evaluation.     Thank you for letting us be part of your care      .

## 2024-09-08 NOTE — ED PROVIDER NOTES
Emergency Medicine Note  HPI   HISTORY OF PRESENT ILLNESS   Patient is a 69-year-old male with a past medical history of stroke with Wallenberg syndrome, type 2 diabetes, hearing loss who comes to the ED due to lightheadedness, general malaise and fatigue.  Patient reports he returned yesterday from a retreat, after he got home he reports he went to sleep until today due to being tired, today he continued feeling tired also was feeling lightheaded, especially when going from laying to sitting, sensation that he was going to pass out, feeling fatigue and general malaise, decreased appetite for which he came to the ED for evaluation.  Patient denies fever, chills, syncope, chest pain, shortness of breath, weakness, numbness or tingling, dysuria, hematuria, bloody stools  HPI      Patient History   PAST HISTORY     Reviewed from Nursing Triage:       Past Medical History:   Diagnosis Date    Hearing loss     deaf in right ear, left hearing loss    Stroke (CMS/[facility]) 02/23/2014    Type 2 diabetes mellitus (CMS/[facility])        Past Surgical History:   Procedure Laterality Date    NO PAST SURGERIES         No family history on file.    Social History     Tobacco Use    Smoking status: Never    Smokeless tobacco: Never   Vaping Use    Vaping Use: Never used   Substance Use Topics    Alcohol use: Yes     Comment: socially    Drug use: No         Review of Systems   REVIEW OF SYSTEMS     Review of Systems   Constitutional:  Positive for fatigue. Negative for chills and fever.   HENT:  Negative for sore throat.    Respiratory:  Negative for cough and shortness of breath.    Cardiovascular:  Negative for chest pain and palpitations.   Gastrointestinal:  Negative for abdominal pain, constipation, diarrhea, nausea and vomiting.   Genitourinary:  Negative for dysuria and hematuria.   Skin:  Negative for color change and rash.   Neurological:  Positive for light-headedness. Negative for dizziness, syncope, speech difficulty and  headaches.   All other systems reviewed and are negative.        VITALS     ED Vitals      Date/Time Temp Pulse Resp BP SpO2 [facility]   09/08/24 1645 -- 78 21 [place] [facility] [place]   09/08/24 1500 -- 84 25 [place] [facility] [place]   09/08/24 1400 -- 78 16 -- [place] [facility] [place]   09/08/24 0944 37.8 °C (100 °F) 75 15 173/80 98 % BM          Pulse Ox %: 98 % (09/08/24 1023)  Pulse Ox Interpretation: Normal (09/08/24 1023)  Heart Rate: 75 (09/08/24 1043)  Rhythm Strip Interpretation: Normal Sinus Rhythm (09/08/24 1043)     Physical Exam   PHYSICAL EXAM     Physical Exam  Constitutional:       General: He is not in acute distress.     Appearance: He is not ill-appearing, toxic-appearing or diaphoretic.      Comments: Patient is alert, answers all questions adequately, is hard of hearing   HENT:      Head: Normocephalic and atraumatic.      Mouth/Throat:      Pharynx: Oropharynx is clear. No oropharyngeal exudate or posterior oropharyngeal erythema.   Eyes:      General: No scleral icterus.     Extraocular Movements: Extraocular movements intact.      Conjunctiva/sclera: Conjunctivae normal.      Pupils: Pupils are equal, round, and reactive to light.   Cardiovascular:      Rate and Rhythm: Normal rate and regular rhythm.      Pulses: Normal pulses.      Heart sounds: Normal heart sounds. No murmur heard.     No friction rub. No gallop.   Pulmonary:      Effort: Pulmonary effort is normal.      Breath sounds: Normal breath sounds. No wheezing, rhonchi or rales.   Abdominal:      General: Bowel sounds are normal.      Tenderness: There is no abdominal tenderness. There is no guarding or rebound.   Musculoskeletal:      Right lower leg: No edema.      Left lower leg: No edema.   Skin:     Findings: No lesion or rash.   Neurological:      General: No focal deficit present.      Mental Status: He is alert and oriented to person, place, and time.      Cranial Nerves: No cranial nerve deficit or dysarthria.      Sensory: Sensation is intact. No sensory deficit.       Motor: No weakness, tremor or atrophy.      Coordination: Finger-Nose-Finger Test normal.      Gait: Gait normal.      Comments: Patient with loss of hearing on the right side           PROCEDURES     Procedures     DATA     Results       Procedure Component Value Units Date/Time    SARS-COV-2 (COVID-19)/ FLU A/B, AND RSV, PCR Nasopharynx [[id number]]  (Abnormal) Collected: 09/08/24 0955    Specimen: Nasopharyngeal Swab from Nasopharynx Updated: 09/08/24 1131     SARS-CoV-2 (COVID-19) Positive     Influenza A Negative     Influenza B Negative     Respiratory Syncytial Virus Negative    Narrative:      Testing performed using real-time PCR for detection of COVID-19. EUA approved validation studies performed on site.     CBC and differential [[id number]]  (Abnormal) Collected: 09/08/24 1015    Specimen: Blood, Venous Updated: 09/08/24 1130     WBC 7.83 K/uL      RBC 4.62 M/uL      Hemoglobin 13.8 g/dL      Hematocrit 41.4 %      MCV 89.6 fL      MCH 29.9 pg      MCHC 33.3 g/dL      RDW 15.0 %      Platelets 140 K/uL      Comment: ALL RESULTS HAVE BEEN RECHECKED        MPV 10.6 fL      Differential Type Auto     nRBC 0.0 %      Immature Granulocytes 0.4 %      Neutrophils 84.2 %      Lymphocytes 6.5 %      Monocytes 8.3 %      Eosinophils 0.1 %      Basophils 0.5 %      Immature Granulocytes, Absolute 0.03 K/uL      Neutrophils, Absolute 6.59 K/uL      Lymphocytes, Absolute 0.51 K/uL      Monocytes, Absolute 0.65 K/uL      Eosinophils, Absolute 0.01 K/uL      Basophils, Absolute 0.04 K/uL     HS Troponin (with 2 hour reflex) [[id number]]  (Abnormal) Collected: 09/08/24 1015    Specimen: Blood, Venous Updated: 09/08/24 1111     High Sens Troponin I 17.7 pg/mL     Comprehensive metabolic panel [[id number]]  (Abnormal) Collected: 09/08/24 1015    Specimen: Blood, Venous Updated: 09/08/24 1103     Sodium 137 mEQ/L      Potassium 4.1 mEQ/L      Comment: Results obtained on plasma. Plasma Potassium values may be up to 0.4 mEQ/L  less than serum values. The differences may be greater for patients with high platelet or white cell counts.        Chloride 103 mEQ/L      CO2 25 mEQ/L      BUN 17 mg/dL      Creatinine 1.2 mg/dL      Glucose 250 mg/dL      Calcium 9.5 mg/dL      AST (SGOT) 16 IU/L      ALT (SGPT) 26 IU/L      Alkaline Phosphatase 40 IU/L      Total Protein 7.7 g/dL      Comment: Test performed on plasma which typically contains approximately 0.4 g/dL more protein than serum.        Albumin 4.7 g/dL      Bilirubin, Total 3.4 mg/dL      eGFR >60.0 mL/min/1.73m*2      Comment: Calculation based on the Chronic Kidney Disease Epidemiology Collaboration (CKD-EPI) equation refit without adjustment for race.        Anion Gap 9 mEQ/L             Imaging Results              MRI BRAIN WITHOUT CONTRAST (Final result)  Result time 09/08/24 18:15:31      Final result                   Impression:    IMPRESSION: No evidence of new hemorrhage, mass or acute infarct.    COMMENT:    Postsurgical change: None.    Restricted diffusion: None  Brain parenchyma: There is no intraparenchymal hemorrhage, mass effect, midline  shift or extra-axial fluid collection.  Left cerebellar hemosiderin staining  from previous hemorrhage.  White matter changes: Normal for age  Ventricles, cisterns, and sulci: Normal in size and configuration.  Sella: Normal in appearance.  Cerebellar tonsils: Normal.      Calvarium and extra cranial soft tissues: Normal.  Paranasal sinuses: Clear bilaterally.  Mastoid air cell: Partial opacification on the left.  Vascular flow voids: Normal  Orbits: Normal  Nasopharynx: Normal                     Narrative:        CLINICAL HISTORY: Vertigo    STUDY: MRI examination of the brain performed without intravenous contrast.    COMPARISON: Head CT 9/8/2024.                                       CT HEAD WITHOUT IV CONTRAST (Final result)  Result time 09/08/24 14:18:59      Final result                   Impression:    IMPRESSION:  No  evidence of an acute territorial infarct, intracranial hemorrhage or  significant interval change.    COMMENT:    Comparison:  CT and MRI dating back to 3/5/2014.    TECHNIQUE: CT of the brain was performed and reconstructed/reformatted in the  axial, coronal and sagittal planes.  CT DOSE:  One or more dose reduction techniques (e.g. automated exposure  control, adjustment of the mA and/or kV according to patient size, use of  iterative reconstruction technique) utilized for this examination.    INTRAVENOUS CONTRAST: None  Limited evaluation of the posterior fossa.  Brain parenchyma:  Age related involution and  ischemic small vessel changes.  Gray-white matter differentiation is normal.  No evidence of intracranial  hemorrhage, midline shift or other mass effect.  Ventricles and cisterns:  Unchanged in size and configuration.  Cranial carotid arteries: Mural calcification.  Calvarium and extra cranial soft tissues:  No evidence of a displaced  calvarial fracture.   Scalp soft tissue within normal limits.  Visualized paranasal sinuses and mastoid air cells:  Ethmoid and sphenoid  sinus mild mucosal thickening.                   Narrative:    CLINICAL HISTORY:  Stroke, follow up  Neuro deficit, acute, stroke suspected  HA                                       X-RAY CHEST 1 VIEW (Final result)  Result time 09/08/24 11:01:09      Final result                   Impression:    IMPRESSION:No evidence of acute disease    COMMENT:Frontal portable erect view of the chest was performed.  Lungs are  clear.  No evidence of a pleural effusion, pneumothorax or pneumomediastinum.  Cardiomegaly.  Comparison made with 2/23/2014 examination.               Narrative:    CLINICAL HISTORY: alt ms                                      ECG 12 lead          Scoring tools                                  ED Course & MDM   MDM / ED COURSE / CLINICAL IMPRESSION / DISPO     Medical Decision Making  Amount and/or Complexity of Data  Reviewed  Labs: ordered. Decision-making details documented in ED Course.  Radiology: ordered. Decision-making details documented in ED Course.  ECG/medicine tests: ordered.    Risk  Prescription drug management.        ED Course as of 09/08/24 2136   Sun Sep 08, 2024   1042 Will provide IV fluids monitorings  Since patient's previous stroke had similar symptoms, will obtain CT brain labs  Neuroconsult [HL]   1121 High Sens Troponin I(!): 17.7  First [CK]   1135 SARS-CoV-2 (COVID-19)(!): Positive [CK]   1135 X-RAY CHEST 1 VIEW  IMPRESSION:No evidence of acute disease [CK]   1430 CT HEAD WITHOUT IV CONTRAST  IMPRESSION:  No evidence of an acute territorial infarct, intracranial hemorrhage or  significant interval change.   [CK]   1444 High Sens Troponin I(!): 17.5  Delta is negative [CK]   1456 Discussed patient with neurology Dr. Henry, we will get an MRI without contrast [CK]   1846 MRI BRAIN WITHOUT CONTRAST  IMPRESSION: No evidence of new hemorrhage, mass or acute infarct. [CK]   1948 Patient presents improvement of symptoms.  Orthostatics vitals normal, patient was able to eat and drink without problem.  He is stable for discharge.  He is recommended to follow-up with his primary care provider for reevaluation and continued care.  Return precautions were explained and understood by patient [CK]      ED Course User Index  [CK] Gloria Gage PA C  [HL] Ronaldo Castillo MD     Clinical Impression      COVID-19   Lightheadedness     _________________       ED Disposition   Discharge                       Gloria Gage PA C  09/08/24 2136

## 2024-09-08 NOTE — ED ATTESTATION NOTE
I have personally seen and examined Kvng Garland. I personally performed the key components of the encounter and provided a substantive portion of the care and medical decision making for this patient.    I reviewed and agree with physician assistant’s assessment and plan of care, with any exceptions as documented below.     My focused history, examination, assessment, and plan of care of Kvng Garland is as follows:    Brief History:   69-year-old with history of Wallenberg stroke approximately 10 years ago, history of diabetes presented with diffuse generalized malaise with lightheadedness more positional orthostatic lightheadedness especially this morning.  Denies any fever.  Was at a retreat yesterday when he took a nap at 1 PM and slept all the way through to 7 PM.  Denies any URI symptoms or coughing.  Denies any vomiting or diarrhea.  Denies any melena or bloody stools.  Focused Physical Exam:   Vitals:    09/08/24 0944   BP: (!) 173/80   Pulse: 75   Resp: 15   Temp: 37.8 °C (100 °F)   SpO2: 98%   Awake and alert good eye contact pleasant cooperative.  Right pupil irregular shaped from previous surgery, EOMI without nystagmus diplopia no facial asymmetry.  Full equal strength without any facial asymmetry  No carotid bruit  Assessment / Plan / MDM:  Clinical history and exam concerning for but not limited to:  Monitorings IV fluids  Viral swab CT brain labs neuroconsult         Ronaldo Castillo MD  09/08/24 1707

## 2024-12-19 ENCOUNTER — TELEPHONE (OUTPATIENT)
Dept: FAMILY MEDICINE | Facility: CLINIC | Age: 69
End: 2024-12-19

## 2024-12-19 NOTE — TELEPHONE ENCOUNTER
Referred To  [facility]  Specialty: [facility]  Tier 2  Group NPI: [id number]  Provider ID: [id number]  Tax ID: [id number]    Confirmation - Referral [id number]  Effective: 11/25/2024     Expires: 02/23/2025

## 2025-01-31 ENCOUNTER — TELEPHONE (OUTPATIENT)
Dept: FAMILY MEDICINE | Facility: CLINIC | Age: 70
End: 2025-01-31
Payer: COMMERCIAL

## 2025-01-31 NOTE — TELEPHONE ENCOUNTER
Spoke to pt who talked to his endocrinologist Dr Mosqueda who ordered blood work which he got done today and will be in touch with pt Monday.

## 2025-03-07 ENCOUNTER — TELEPHONE (OUTPATIENT)
Dept: FAMILY MEDICINE | Facility: CLINIC | Age: 70
End: 2025-03-07
Payer: COMMERCIAL

## 2025-03-07 NOTE — TELEPHONE ENCOUNTER
[facility] referral     Confirmation - Referral [id number]  Effective: 03/07/2025     Expires: 06/05/2025

## 2025-03-19 ENCOUNTER — TELEPHONE (OUTPATIENT)
Dept: FAMILY MEDICINE | Facility: CLINIC | Age: 70
End: 2025-03-19
Payer: COMMERCIAL

## 2025-03-19 NOTE — TELEPHONE ENCOUNTER
Referred To  [facility]  Specialty: [facility]ty [facility]  Tier 2  Group NPI: [id number]  Provider ID: [id number]  Tax ID: [id number]  Individual Provider NPI: [id number]  Provider Name: CLAIRE DODD MD    Confirmation - Referral [id number]  Effective: 03/19/2025     Expires: 06/17/2025

## 2025-07-15 ENCOUNTER — TELEPHONE (OUTPATIENT)
Dept: FAMILY MEDICINE | Facility: CLINIC | Age: 70
End: 2025-07-15
Payer: COMMERCIAL

## 2025-07-15 NOTE — TELEPHONE ENCOUNTER
Endocrinology  Referred To  [facility]  Specialty: [facility]  Group NPI: [id number]      Confirmation - Referral [id number]  Effective: 07/15/2025     Expires: 10/13/2025